# Patient Record
Sex: MALE | Race: WHITE | NOT HISPANIC OR LATINO | Employment: FULL TIME | URBAN - METROPOLITAN AREA
[De-identification: names, ages, dates, MRNs, and addresses within clinical notes are randomized per-mention and may not be internally consistent; named-entity substitution may affect disease eponyms.]

---

## 2017-10-17 ENCOUNTER — OFFICE VISIT (OUTPATIENT)
Dept: FAMILY MEDICINE | Facility: CLINIC | Age: 47
End: 2017-10-17
Payer: COMMERCIAL

## 2017-10-17 VITALS
OXYGEN SATURATION: 97 % | BODY MASS INDEX: 28.31 KG/M2 | TEMPERATURE: 97.2 F | DIASTOLIC BLOOD PRESSURE: 89 MMHG | HEART RATE: 93 BPM | HEIGHT: 73 IN | WEIGHT: 213.6 LBS | SYSTOLIC BLOOD PRESSURE: 143 MMHG

## 2017-10-17 DIAGNOSIS — M62.838 NECK MUSCLE SPASM: Primary | ICD-10-CM

## 2017-10-17 PROCEDURE — 99213 OFFICE O/P EST LOW 20 MIN: CPT | Performed by: FAMILY MEDICINE

## 2017-10-17 RX ORDER — DICLOFENAC SODIUM 75 MG/1
75 TABLET, DELAYED RELEASE ORAL 2 TIMES DAILY PRN
Qty: 30 TABLET | Refills: 0 | Status: SHIPPED | OUTPATIENT
Start: 2017-10-17 | End: 2018-04-25

## 2017-10-17 RX ORDER — HYDROCODONE BITARTRATE AND ACETAMINOPHEN 5; 325 MG/1; MG/1
1 TABLET ORAL EVERY 12 HOURS PRN
Qty: 5 TABLET | Refills: 0 | Status: SHIPPED | OUTPATIENT
Start: 2017-10-17 | End: 2018-04-25

## 2017-10-17 RX ORDER — CYCLOBENZAPRINE HCL 10 MG
10 TABLET ORAL 3 TIMES DAILY PRN
Qty: 42 TABLET | Refills: 0 | Status: SHIPPED | OUTPATIENT
Start: 2017-10-17 | End: 2018-04-25

## 2017-10-17 NOTE — MR AVS SNAPSHOT
After Visit Summary   10/17/2017    Michael Waters    MRN: [id number]           Patient Information     Date Of Birth          1970        Visit Information        Provider Department      10/17/2017 1:00 PM Kira Will MD [facility]        Today's Diagnoses     Neck muscle spasm    -  1      Care Instructions      Neck Spasm     A spasm of the neck muscles can happen after a sudden awkward neck movement. Sleeping with your neck in a crooked position can also cause spasm. Some people respond to emotional stress by tensing the muscles of their neck, shoulders, and upper back. If neck spasm lasts long enough, it can cause headache.  The treatment described below will usually help the pain to go away in 5 to 7 days. Pain that continues may need further evaluation or other types of treatment such as physical therapy.  Home care    Rest and relax the muscles. Use a comfortable pillow that supports the head and keeps the spine in a neutral position. The position of the head should not be tilted forward or backward. A rolled up towel may help for a custom fit.    Some people find relief with heat. Heat can be applied with either a warm shower or bath or a moist towel heated in the microwave and massage. Others prefer cold packs. You can make an ice pack by filling a plastic bag that seals at the top with ice cubes or crushed ice and then wrapping it with a thin towel. Try both and use the method that feels best for 15 to 20 minutes, several times a day.    Whether using ice or heat, be careful that you do not injure your skin. Never put ice directly on the skin. Always wrap the ice in a towel or other type of cloth. This is very important, especially in people with poor skin sensations.    Try to reduce your stress level. Emotional stress can lead to neck muscle tension and get in the way of or delay the healing process.    You may use over-the-counter pain medicine to control pain,  unless another medicine was prescribed.If you have chronic liver or kidney disease or ever had a stomach ulcer or GI bleeding, talk with your healthcare provider before using these medicines.  Follow-up care  Follow up with your healthcare provider if your symptoms do not show signs of improvement after one week. Physical therapy or further tests may be needed.  If X-rays, CT scans, or MRI scans were taken, you will be told of any new findings that may affect your care.  Call 911  Call 911 if you have:    Sudden weakness or numbness in one or both arms    Neck swelling, difficulty or painful swallowing    Difficulty breathing    Chest pain  When to seek medical advice  Call your healthcare provider right away if any of these occur:    Pain becomes worse or spreads into one or both arms    Increasing headache with nausea or vomiting    Fever of 100.4 F (38 C) or above lasting for 24 to 48 hours  Date Last Reviewed: 11/21/2015 2000-2017 [employer]. [address] [place] [address]. All rights reserved. This information is not intended as a substitute for professional medical care. Always follow your healthcare professional's instructions.                Follow-ups after your visit        Follow-up notes from your care team     Return if symptoms worsen or fail to improve.      Who to contact     Normal or non-critical lab and imaging results will be communicated to you by MyChart, letter or phone within 4 business days after the clinic has received the results. If you do not hear from us within 7 days, please contact the clinic through MyChart or phone. If you have a critical or abnormal lab result, we will notify you by phone as soon as possible.  Submit refill requests through [employer] or call your pharmacy and they will forward the refill request to us. Please allow 3 business days for your refill to be completed.          If you need to speak with a  for additional  "information , please call: [phone]             Additional Information About Your Visit        [employer] Information     [employer] gives you secure access to your electronic health record. If you see a primary care provider, you can also send messages to your care team and make appointments. If you have questions, please call your primary care clinic.  If you do not have a primary care provider, please call [phone] and they will assist you.        Care EveryWhere ID     This is your Care EveryWhere ID. This could be used by other organizations to access your [facility] medical records  [id number]        Your Vitals Were     Pulse Temperature Height Pulse Oximetry BMI (Body Mass Index)       93 97.2  F (36.2  C) (Tympanic) 6' 1\" (1.854 m) 97% 28.18 kg/m2        Blood Pressure from Last 3 Encounters:   10/17/17 143/89   05/20/16 111/79   11/24/15 126/86    Weight from Last 3 Encounters:   10/17/17 213 lb 9.6 oz (96.9 kg)   05/20/16 212 lb (96.2 kg)   11/24/15 218 lb (98.9 kg)              Today, you had the following     No orders found for display         Today's Medication Changes          These changes are accurate as of: 10/17/17  1:18 PM.  If you have any questions, ask your nurse or doctor.               Start taking these medicines.        Dose/Directions    cyclobenzaprine 10 MG tablet   Commonly known as:  FLEXERIL   Used for:  Neck muscle spasm   Started by:  Kira Will MD        Dose:  10 mg   Take 1 tablet (10 mg) by mouth 3 times daily as needed   Quantity:  42 tablet   Refills:  0       diclofenac 75 MG EC tablet   Commonly known as:  VOLTAREN   Used for:  Neck muscle spasm   Started by:  Kira Will MD        Dose:  75 mg   Take 1 tablet (75 mg) by mouth 2 times daily as needed for moderate pain (take with meals)   Quantity:  30 tablet   Refills:  0       HYDROcodone-acetaminophen 5-325 MG per tablet   Commonly known as:  NORCO   Used for:  Neck muscle spasm   Started by:  Nicolette" MD Kira        Dose:  1 tablet   Take 1 tablet by mouth every 12 hours as needed for moderate to severe pain (breakthrough pain.) maximum 2 tablet(s) per day   Quantity:  5 tablet   Refills:  0            Where to get your medicines      These medications were sent to [id number] - [place], [place] - [address]  [address] [place] [address]     Phone:  [phone]     cyclobenzaprine 10 MG tablet    diclofenac 75 MG EC tablet         Some of these will need a paper prescription and others can be bought over the counter.  Ask your nurse if you have questions.     Bring a paper prescription for each of these medications     HYDROcodone-acetaminophen 5-325 MG per tablet                Primary Care Provider Office Phone # Fax #    Devyn Hanson PA-C [phone]       [address]  [place] [address]        Equal Access to Services     [facility]: Hadii aad ku hadasho Soomaali, waaxda luqadaha, qaybta kaalmada adeegyada, waxay jenaroin hayaan adetania lafleur . So [facility] [phone].    ATENCIÓN: Si habla español, tiene a lambert disposición servicios gratuitos de asistencia lingüística. [facility] [phone].    We comply with applicable federal civil rights laws and [place] laws. We do not discriminate on the basis of race, color, national origin, age, disability, sex, sexual orientation, or gender identity.            Thank you!     Thank you for choosing [facility]  for your care. Our goal is always to provide you with excellent care. Hearing back from our patients is one way we can continue to improve our services. Please take a few minutes to complete the written survey that you may receive in the mail after your visit with us. Thank you!             Your Updated Medication List - Protect others around you: Learn how to safely use, store and throw away your medicines at www.disposemymeds.org.          This list is accurate as of: 10/17/17  1:18 PM.   Always use your most recent med list.                   Brand Name Dispense Instructions for use Diagnosis    cyclobenzaprine 10 MG tablet    FLEXERIL    42 tablet    Take 1 tablet (10 mg) by mouth 3 times daily as needed    Neck muscle spasm       diclofenac 75 MG EC tablet    VOLTAREN    30 tablet    Take 1 tablet (75 mg) by mouth 2 times daily as needed for moderate pain (take with meals)    Neck muscle spasm       HYDROcodone-acetaminophen 5-325 MG per tablet    NORCO    5 tablet    Take 1 tablet by mouth every 12 hours as needed for moderate to severe pain (breakthrough pain.) maximum 2 tablet(s) per day    Neck muscle spasm
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
## 2017-10-17 NOTE — NURSING NOTE
"Chief Complaint   Patient presents with     Neck Pain       Initial /89  Pulse 93  Temp 97.2  F (36.2  C) (Tympanic)  Ht 6' 1\" (1.854 m)  Wt 213 lb 9.6 oz (96.9 kg)  SpO2 97%  BMI 28.18 kg/m2 Estimated body mass index is 28.18 kg/(m^2) as calculated from the following:    Height as of this encounter: 6' 1\" (1.854 m).    Weight as of this encounter: 213 lb 9.6 oz (96.9 kg).  Medication Reconciliation: complete       Yoli Lane MA      "

## 2017-10-17 NOTE — PATIENT INSTRUCTIONS
Neck Spasm     A spasm of the neck muscles can happen after a sudden awkward neck movement. Sleeping with your neck in a crooked position can also cause spasm. Some people respond to emotional stress by tensing the muscles of their neck, shoulders, and upper back. If neck spasm lasts long enough, it can cause headache.  The treatment described below will usually help the pain to go away in 5 to 7 days. Pain that continues may need further evaluation or other types of treatment such as physical therapy.  Home care    Rest and relax the muscles. Use a comfortable pillow that supports the head and keeps the spine in a neutral position. The position of the head should not be tilted forward or backward. A rolled up towel may help for a custom fit.    Some people find relief with heat. Heat can be applied with either a warm shower or bath or a moist towel heated in the microwave and massage. Others prefer cold packs. You can make an ice pack by filling a plastic bag that seals at the top with ice cubes or crushed ice and then wrapping it with a thin towel. Try both and use the method that feels best for 15 to 20 minutes, several times a day.    Whether using ice or heat, be careful that you do not injure your skin. Never put ice directly on the skin. Always wrap the ice in a towel or other type of cloth. This is very important, especially in people with poor skin sensations.    Try to reduce your stress level. Emotional stress can lead to neck muscle tension and get in the way of or delay the healing process.    You may use over-the-counter pain medicine to control pain, unless another medicine was prescribed.If you have chronic liver or kidney disease or ever had a stomach ulcer or GI bleeding, talk with your healthcare provider before using these medicines.  Follow-up care  Follow up with your healthcare provider if your symptoms do not show signs of improvement after one week. Physical therapy or further tests may be  needed.  If X-rays, CT scans, or MRI scans were taken, you will be told of any new findings that may affect your care.  Call 911  Call 911 if you have:    Sudden weakness or numbness in one or both arms    Neck swelling, difficulty or painful swallowing    Difficulty breathing    Chest pain  When to seek medical advice  Call your healthcare provider right away if any of these occur:    Pain becomes worse or spreads into one or both arms    Increasing headache with nausea or vomiting    Fever of 100.4 F (38 C) or above lasting for 24 to 48 hours  Date Last Reviewed: 11/21/2015 2000-2017 [employer]. [address] [place] [address]. All rights reserved. This information is not intended as a substitute for professional medical care. Always follow your healthcare professional's instructions.

## 2018-04-25 ENCOUNTER — OFFICE VISIT (OUTPATIENT)
Dept: INTERNAL MEDICINE | Facility: CLINIC | Age: 48
End: 2018-04-25
Payer: COMMERCIAL

## 2018-04-25 VITALS
BODY MASS INDEX: 27.97 KG/M2 | SYSTOLIC BLOOD PRESSURE: 125 MMHG | DIASTOLIC BLOOD PRESSURE: 83 MMHG | HEART RATE: 91 BPM | RESPIRATION RATE: 16 BRPM | TEMPERATURE: 91 F | WEIGHT: 212 LBS

## 2018-04-25 DIAGNOSIS — F33.2 SEVERE EPISODE OF RECURRENT MAJOR DEPRESSIVE DISORDER, WITHOUT PSYCHOTIC FEATURES (H): Primary | ICD-10-CM

## 2018-04-25 PROCEDURE — 36415 COLL VENOUS BLD VENIPUNCTURE: CPT | Performed by: INTERNAL MEDICINE

## 2018-04-25 PROCEDURE — 99215 OFFICE O/P EST HI 40 MIN: CPT | Performed by: INTERNAL MEDICINE

## 2018-04-25 PROCEDURE — 84443 ASSAY THYROID STIM HORMONE: CPT | Performed by: INTERNAL MEDICINE

## 2018-04-25 RX ORDER — ESCITALOPRAM OXALATE 20 MG/1
TABLET ORAL
Qty: 30 TABLET | Refills: 1 | Status: SHIPPED | OUTPATIENT
Start: 2018-04-25 | End: 2018-06-25

## 2018-04-25 ASSESSMENT — ANXIETY QUESTIONNAIRES
5. BEING SO RESTLESS THAT IT IS HARD TO SIT STILL: SEVERAL DAYS
2. NOT BEING ABLE TO STOP OR CONTROL WORRYING: NEARLY EVERY DAY
IF YOU CHECKED OFF ANY PROBLEMS ON THIS QUESTIONNAIRE, HOW DIFFICULT HAVE THESE PROBLEMS MADE IT FOR YOU TO DO YOUR WORK, TAKE CARE OF THINGS AT HOME, OR GET ALONG WITH OTHER PEOPLE: VERY DIFFICULT
7. FEELING AFRAID AS IF SOMETHING AWFUL MIGHT HAPPEN: SEVERAL DAYS
1. FEELING NERVOUS, ANXIOUS, OR ON EDGE: NEARLY EVERY DAY
GAD7 TOTAL SCORE: 14
3. WORRYING TOO MUCH ABOUT DIFFERENT THINGS: NEARLY EVERY DAY
6. BECOMING EASILY ANNOYED OR IRRITABLE: SEVERAL DAYS

## 2018-04-25 ASSESSMENT — PATIENT HEALTH QUESTIONNAIRE - PHQ9: 5. POOR APPETITE OR OVEREATING: MORE THAN HALF THE DAYS

## 2018-04-25 NOTE — MR AVS SNAPSHOT
After Visit Summary   4/25/2018    Michael Watesr    MRN: [id number]           Patient Information     Date Of Birth          1970        Visit Information        Provider Department      4/25/2018 3:30 PM [facility]        Today's Diagnoses     Severe episode of recurrent major depressive disorder, without psychotic features (H)    -  1       Follow-ups after your visit        Additional Services     MENTAL HEALTH REFERRAL  - Adult; Outpatient Treatment; Dialectical Behavior Therapy; G: [facility] [phone]; We will contact you to schedule the appointment or please call with any questions       All scheduling is subject to the client's specific insurance plan & benefits, provider/location availability, and provider clinical specialities.  Please arrive 15 minutes early for your first appointment and bring your completed paperwork.    Please be aware that coverage of these services is subject to the terms and limitations of your health insurance plan.  Call member services at your health plan with any benefit or coverage questions.                            Follow-up notes from your care team     Return in about 6 weeks (around 6/6/2018) for Depression follow-up.      Who to contact     Normal or non-critical lab and imaging results will be communicated to you by MyChart, letter or phone within 4 business days after the clinic has received the results. If you do not hear from us within 7 days, please contact the clinic through MyChart or phone. If you have a critical or abnormal lab result, we will notify you by phone as soon as possible.  Submit refill requests through [employer] or call your pharmacy and they will forward the refill request to us. Please allow 3 business days for your refill to be completed.          If you need to speak with a  for additional information , please call: [phone]             Additional  Information About Your Visit        [employer] Information     [employer] gives you secure access to your electronic health record. If you see a primary care provider, you can also send messages to your care team and make appointments. If you have questions, please call your primary care clinic.  If you do not have a primary care provider, please call [phone] and they will assist you.        Care EveryWhere ID     This is your Care EveryWhere ID. This could be used by other organizations to access your [facility] medical records  [id number]        Your Vitals Were     Pulse Temperature Respirations BMI (Body Mass Index)          91 91  F (32.8  C) (Tympanic) 16 27.97 kg/m2         Blood Pressure from Last 3 Encounters:   04/25/18 125/83   10/17/17 143/89   05/20/16 111/79    Weight from Last 3 Encounters:   04/25/18 212 lb (96.2 kg)   10/17/17 213 lb 9.6 oz (96.9 kg)   05/20/16 212 lb (96.2 kg)              We Performed the Following     MENTAL HEALTH REFERRAL  - Adult; Outpatient Treatment; Dialectical Behavior Therapy; FMG: [facility] [phone]; We will contact you to schedule the appointment or please call with any questions     TSH with free T4 reflex          Today's Medication Changes          These changes are accurate as of 4/25/18  4:19 PM.  If you have any questions, ask your nurse or doctor.               Start taking these medicines.        Dose/Directions    escitalopram 20 MG tablet   Commonly known as:  LEXAPRO   Used for:  Severe episode of recurrent major depressive disorder, without psychotic features (H)   Started by:  Rip Garcia MD        Take 1/2 tablet (10 mg) for 1-2 weeks, then increase to 1 tablet orally daily   Quantity:  30 tablet   Refills:  1         Stop taking these medicines if you haven't already. Please contact your care team if you have questions.     cyclobenzaprine 10 MG tablet   Commonly known as:  FLEXERIL   Stopped by:  Rip Garcia MD            diclofenac 75 MG EC tablet   Commonly known as:  VOLTAREN   Stopped by:  Rip Garcia MD           HYDROcodone-acetaminophen 5-325 MG per tablet   Commonly known as:  NORCO   Stopped by:  Rip Garcia MD                Where to get your medicines      These medications were sent to [id number] - [place], [place] - [address]  [address], [place] [address]     Phone:  [phone]     escitalopram 20 MG tablet                Primary Care Provider Office Phone # Fax #    Devyn Hanson PA-C [phone] [address] [facility] [address]        Equal Access to Services     [facility]: Hadii aad ku hadasho Soomaali, waaxda luqadaha, qaybta kaalmada adeegyada, waxjennifer easonin haymikin marcelina lafleur . So [facility] [phone].    ATENCIÓN: Si habla español, tiene a lambert disposición servicios gratuitos de asistencia lingüística. Llame al [phone].    We comply with applicable federal civil rights laws and [place] laws. We do not discriminate on the basis of race, color, national origin, age, disability, sex, sexual orientation, or gender identity.            Thank you!     Thank you for choosing [facility]  for your care. Our goal is always to provide you with excellent care. Hearing back from our patients is one way we can continue to improve our services. Please take a few minutes to complete the written survey that you may receive in the mail after your visit with us. Thank you!             Your Updated Medication List - Protect others around you: Learn how to safely use, store and throw away your medicines at www.disposemymeds.org.          This list is accurate as of 4/25/18  4:19 PM.  Always use your most recent med list.                   Brand Name Dispense Instructions for use Diagnosis    escitalopram 20 MG tablet    LEXAPRO    30 tablet    Take 1/2 tablet (10 mg) for 1-2 weeks, then increase to 1 tablet orally daily    Severe episode of recurrent  major depressive disorder, without psychotic features (H)

## 2018-04-25 NOTE — PROGRESS NOTES
"  SUBJECTIVE:   Michael Waters is a 47 year old male who presents to clinic today for the following health issues:      Abnormal Mood Symptoms  Onset: 20 years but symptoms increased over the past month    Description:   Depression: YES  Anxiety: YES    Accompanying Signs & Symptoms:  Still participating in activities that you used to enjoy: no  Fatigue: YES  Irritability: YES  Difficulty concentrating: YES  Changes in appetite: YES  Problems with sleep: no  Heart racing/beating fast : YES  Thoughts of hurting yourself or others: Presently YES and yes    History:   Recent stress: no  Prior depression hospitalization: yes -x2  Family history of depression: no  Family history of anxiety: no    Precipitating factors:   Alcohol/drug use: YES- occasional drinker    Alleviating factors:  nothing    Therapies Tried and outcome: Ativan (Lorezepam), Effexor XR (Venafaxine), Paxil (Paroxetine), Prozac (Fluoxetine) and Wellbutrin (Bupropion) -- was on all of these many years ago -- he believes approximately 20-25 years ago last.  Did have some psychotherapy but no recent work or medications.  He denies anything more than occasional alcohol.  Denies other chemical use, significant new psychosocial stressors.  He is most disturbed by lack of energy and desire to sleep all the time.      Importantly, he endorses significant suicidal ideation with deliberation toward plan but without clear current intent or plan.  He reports desire to not suicide and wants to be out of his dark depression.  His coworkers and family are unaware but his parents are aware of his more distant depression with, as patient reports is, history of \"weekend stays\" in the hospital.    Social History     Social History Narrative    Works for [employer]    Re-    3 kids       Reports otherwise normal health.    1. Severe episode of recurrent major depressive disorder, without psychotic features (H)        PMH: Updated " and/or reviewed in chart.    PSH: Updated and/or reviewed in chart.    Family History: Updated and/or reviewed in chart.     ROS:  Constitutional, neuro, EMT, endocrine, pulmonary, cardiac, gastrointestinal, genitourinary, musculoskeletal, integument and psychiatric systems are otherwise negative.    OBJECTIVE:                                                    /83  Pulse 91  Temp (!) 91  F (32.8  C) (Tympanic)  Resp 16  Wt 212 lb (96.2 kg)  BMI 27.97 kg/m2    GEN: No acute distress  EYES: No conjunctival injection or icterus, EOMI grossly intact  RESP: Unlabored, regular  NEURO: Normal gait, MAEx4, light touch sensation grossly intact  PSYCH: Depressed mood, tearful, otherwise appropriate affect     ASSESSMENT/PLAN:                                                    1. Severe episode of recurrent major depressive disorder, without psychotic features (H)  We discussed risks, benefits, and alternatives of SSRI treatment.  He had been reading up and was open to trial of sertraline or escitalopram.  We discussed importance of psychotherapy as well as pharmacotherapy, safety plan (pt to call here or 911 if return of suicidal ideation, intent, or plan), and medication follow-up in 6 weeks.  Would trial sertraline if failing escitalopram then consider newer agents given significant poor history of responses to other SSRIs, such as vortioxetine.  Patient agreed with plan and demonstrated understanding to contact us for help if not improving or sooner if worsening or if other questions or concerns arise.  - MENTAL HEALTH REFERRAL  - Adult; Outpatient Treatment; Dialectical Behavior Therapy; [facility] [place]: [facility] [phone]; We will contact you to schedule the appointment or please call with any questions  - escitalopram (LEXAPRO) 20 MG tablet; Take 1/2 tablet (10 mg) for 1-2 weeks, then increase to 1 tablet orally daily  Dispense: 30 tablet; Refill: 1  - TSH with free T4 reflex     Orders Placed  This Encounter     TSH with free T4 reflex     MENTAL HEALTH REFERRAL  - Adult; Outpatient Treatment; Dialectical Behavior Therapy; [facility] [place] [place]: [facility] [phone]; We will contact you to schedule the appointment or please call with any questions     escitalopram (LEXAPRO) 20 MG tablet          Rip Garcia MD

## 2018-04-26 LAB — TSH SERPL DL<=0.005 MIU/L-ACNC: 1.28 MU/L (ref 0.4–4)

## 2018-04-26 ASSESSMENT — ANXIETY QUESTIONNAIRES: GAD7 TOTAL SCORE: 14

## 2018-04-26 ASSESSMENT — PATIENT HEALTH QUESTIONNAIRE - PHQ9: SUM OF ALL RESPONSES TO PHQ QUESTIONS 1-9: 20

## 2018-06-25 DIAGNOSIS — F33.2 SEVERE EPISODE OF RECURRENT MAJOR DEPRESSIVE DISORDER, WITHOUT PSYCHOTIC FEATURES (H): ICD-10-CM

## 2018-06-25 RX ORDER — ESCITALOPRAM OXALATE 20 MG/1
TABLET ORAL
Qty: 30 TABLET | Refills: 0 | Status: SHIPPED | OUTPATIENT
Start: 2018-06-25 | End: 2018-08-22

## 2018-06-25 NOTE — TELEPHONE ENCOUNTER
Requested Prescriptions   Pending Prescriptions Disp Refills     escitalopram (LEXAPRO) 20 MG tablet  Last Written Prescription Date:  05/26/18  Last Fill Quantity: 30,  # refills: 1  Last office visit: 4/25/2018 with prescribing provider:  SELVIN Garcia   Future Office Visit:     30 tablet 1     Sig: Take 1/2 tablet (10 mg) for 1-2 weeks, then increase to 1 tablet orally daily    There is no refill protocol information for this order   PHQ-9 SCORE 12/23/2011 4/25/2018   Total Score 16 -   Total Score - 20     [place]-7 SCORE 4/25/2018   Total Score 14

## 2018-07-02 PROBLEM — F33.2 SEVERE EPISODE OF RECURRENT MAJOR DEPRESSIVE DISORDER, WITHOUT PSYCHOTIC FEATURES (H): Status: ACTIVE | Noted: 2018-07-02

## 2018-08-02 DIAGNOSIS — F33.2 SEVERE EPISODE OF RECURRENT MAJOR DEPRESSIVE DISORDER, WITHOUT PSYCHOTIC FEATURES (H): ICD-10-CM

## 2018-08-02 RX ORDER — ESCITALOPRAM OXALATE 20 MG/1
TABLET ORAL
Qty: 30 TABLET | Refills: 0 | Status: CANCELLED | OUTPATIENT
Start: 2018-08-02

## 2018-08-02 NOTE — TELEPHONE ENCOUNTER
Requested Prescriptions   Pending Prescriptions Disp Refills     escitalopram (LEXAPRO) 20 MG tablet 30 tablet 0     Sig: Take 1/2 tablet (10 mg) for 1-2 weeks, then increase to 1 tablet orally daily    There is no refill protocol information for this order      Last Written Prescription Date:  06/25/18  Last Fill Quantity: 30,  # refills: 0   Last office visit: 5/20/2016 with prescribing provider:     Future Office Visit:

## 2018-08-13 DIAGNOSIS — F33.2 SEVERE EPISODE OF RECURRENT MAJOR DEPRESSIVE DISORDER, WITHOUT PSYCHOTIC FEATURES (H): ICD-10-CM

## 2018-08-13 RX ORDER — ESCITALOPRAM OXALATE 20 MG/1
TABLET ORAL
Qty: 30 TABLET | Refills: 0 | Status: CANCELLED | OUTPATIENT
Start: 2018-08-13

## 2018-08-13 NOTE — TELEPHONE ENCOUNTER
Requested Prescriptions   Pending Prescriptions Disp Refills     escitalopram (LEXAPRO) 20 MG tablet  Last Written Prescription Date:  06/25/18  Last Fill Quantity: 30,  # refills: 0   Last office visit: 4/25/2018 with prescribing provider:  SELVIN Garcia   Future Office Visit:    [place]-7 SCORE 4/25/2018   Total Score 14     PHQ-9 SCORE 12/23/2011 4/25/2018   Total Score 16 -   Total Score - 20      30 tablet 0     Sig: Take 1/2 tablet (10 mg) for 1-2 weeks, then increase to 1 tablet orally daily    There is no refill protocol information for this order

## 2018-08-22 ENCOUNTER — APPOINTMENT (OUTPATIENT)
Dept: INTERNAL MEDICINE | Facility: CLINIC | Age: 48
End: 2018-08-22
Payer: COMMERCIAL

## 2018-08-22 ENCOUNTER — OFFICE VISIT (OUTPATIENT)
Dept: INTERNAL MEDICINE | Facility: CLINIC | Age: 48
End: 2018-08-22
Payer: COMMERCIAL

## 2018-08-22 VITALS
BODY MASS INDEX: 29.12 KG/M2 | RESPIRATION RATE: 16 BRPM | SYSTOLIC BLOOD PRESSURE: 130 MMHG | DIASTOLIC BLOOD PRESSURE: 84 MMHG | HEART RATE: 92 BPM | HEIGHT: 72 IN | TEMPERATURE: 98.4 F | WEIGHT: 215 LBS

## 2018-08-22 DIAGNOSIS — Z13.1 SCREENING FOR DIABETES MELLITUS: ICD-10-CM

## 2018-08-22 DIAGNOSIS — Z12.5 SCREENING FOR PROSTATE CANCER: ICD-10-CM

## 2018-08-22 DIAGNOSIS — F33.2 SEVERE EPISODE OF RECURRENT MAJOR DEPRESSIVE DISORDER, WITHOUT PSYCHOTIC FEATURES (H): Primary | ICD-10-CM

## 2018-08-22 DIAGNOSIS — R04.0 EPISTAXIS: ICD-10-CM

## 2018-08-22 DIAGNOSIS — L20.89 OTHER ATOPIC DERMATITIS: ICD-10-CM

## 2018-08-22 DIAGNOSIS — Z00.00 ROUTINE GENERAL MEDICAL EXAMINATION AT A HEALTH CARE FACILITY: ICD-10-CM

## 2018-08-22 DIAGNOSIS — Z13.220 LIPID SCREENING: ICD-10-CM

## 2018-08-22 DIAGNOSIS — G47.33 OSA (OBSTRUCTIVE SLEEP APNEA): ICD-10-CM

## 2018-08-22 DIAGNOSIS — M79.674 PAIN OF TOE OF RIGHT FOOT: ICD-10-CM

## 2018-08-22 DIAGNOSIS — M71.30 SYNOVIAL CYST: ICD-10-CM

## 2018-08-22 PROCEDURE — 99214 OFFICE O/P EST MOD 30 MIN: CPT | Mod: 25 | Performed by: INTERNAL MEDICINE

## 2018-08-22 PROCEDURE — 99396 PREV VISIT EST AGE 40-64: CPT | Performed by: INTERNAL MEDICINE

## 2018-08-22 RX ORDER — CLOBETASOL PROPIONATE 0.5 MG/G
OINTMENT TOPICAL
Qty: 30 G | Refills: 2 | Status: SHIPPED | OUTPATIENT
Start: 2018-08-22 | End: 2021-07-05

## 2018-08-22 ASSESSMENT — ANXIETY QUESTIONNAIRES
6. BECOMING EASILY ANNOYED OR IRRITABLE: MORE THAN HALF THE DAYS
2. NOT BEING ABLE TO STOP OR CONTROL WORRYING: MORE THAN HALF THE DAYS
3. WORRYING TOO MUCH ABOUT DIFFERENT THINGS: MORE THAN HALF THE DAYS
5. BEING SO RESTLESS THAT IT IS HARD TO SIT STILL: SEVERAL DAYS
7. FEELING AFRAID AS IF SOMETHING AWFUL MIGHT HAPPEN: NOT AT ALL
1. FEELING NERVOUS, ANXIOUS, OR ON EDGE: MORE THAN HALF THE DAYS
GAD7 TOTAL SCORE: 11
IF YOU CHECKED OFF ANY PROBLEMS ON THIS QUESTIONNAIRE, HOW DIFFICULT HAVE THESE PROBLEMS MADE IT FOR YOU TO DO YOUR WORK, TAKE CARE OF THINGS AT HOME, OR GET ALONG WITH OTHER PEOPLE: SOMEWHAT DIFFICULT

## 2018-08-22 ASSESSMENT — PATIENT HEALTH QUESTIONNAIRE - PHQ9: 5. POOR APPETITE OR OVEREATING: MORE THAN HALF THE DAYS

## 2018-08-22 NOTE — PATIENT INSTRUCTIONS
Call insurance company to ask for [employer] testing information (for depression genetics)      Preventive Health Recommendations  Male Ages 40 to 49    Yearly exam:             See your health care provider every year in order to  o   Review health changes.   o   Discuss preventive care.    o   Review your medicines if your doctor has prescribed any.    You should be tested each year for STDs (sexually transmitted diseases) if you re at risk.     Have a cholesterol test every 5 years.     Have a colonoscopy (test for colon cancer) if someone in your family has had colon cancer or polyps before age 50.     After age 45, have a diabetes test (fasting glucose). If you are at risk for diabetes, you should have this test every 3 years.      Talk with your health care provider about whether or not a prostate cancer screening test (PSA) is right for you.    Shots: Get a flu shot each year. Get a tetanus shot every 10 years.     Nutrition:    Eat at least 5 servings of fruits and vegetables daily.     Eat whole-grain bread, whole-wheat pasta and brown rice instead of white grains and rice.     Get adequate Calcium and Vitamin D.     Lifestyle    Exercise for at least 150 minutes a week (30 minutes a day, 5 days a week). This will help you control your weight and prevent disease.     Limit alcohol to one drink per day.     No smoking.     Wear sunscreen to prevent skin cancer.     See your dentist every six months for an exam and cleaning.

## 2018-08-22 NOTE — MR AVS SNAPSHOT
After Visit Summary   8/22/2018    Michael Waters    MRN: [id number]           Patient Information     Date Of Birth          1970        Visit Information        Provider Department      8/22/2018 4:00 PM Rip Garcia MD [facility]        Today's Diagnoses     Severe episode of recurrent major depressive disorder, without psychotic features (H)    -  1    LISE (obstructive sleep apnea)        Lipid screening        Screening for diabetes mellitus        Other atopic dermatitis        Pain of toe of right foot        Screening for prostate cancer        Epistaxis        Synovial cyst of left elbow          Care Instructions       Call insurance company to ask for [employer] testing information (for depression genetics)      Preventive Health Recommendations  Male Ages 40 to 49    Yearly exam:             See your health care provider every year in order to  o   Review health changes.   o   Discuss preventive care.    o   Review your medicines if your doctor has prescribed any.    You should be tested each year for STDs (sexually transmitted diseases) if you re at risk.     Have a cholesterol test every 5 years.     Have a colonoscopy (test for colon cancer) if someone in your family has had colon cancer or polyps before age 50.     After age 45, have a diabetes test (fasting glucose). If you are at risk for diabetes, you should have this test every 3 years.      Talk with your health care provider about whether or not a prostate cancer screening test (PSA) is right for you.    Shots: Get a flu shot each year. Get a tetanus shot every 10 years.     Nutrition:    Eat at least 5 servings of fruits and vegetables daily.     Eat whole-grain bread, whole-wheat pasta and brown rice instead of white grains and rice.     Get adequate Calcium and Vitamin D.     Lifestyle    Exercise for at least 150 minutes a week (30 minutes a day, 5 days a week). This will help you control your weight and  prevent disease.     Limit alcohol to one drink per day.     No smoking.     Wear sunscreen to prevent skin cancer.     See your dentist every six months for an exam and cleaning.              Follow-ups after your visit        Additional Services     MENTAL HEALTH REFERRAL  - Adult; Outpatient Treatment; Individual/Couples/Family/Group Therapy/Health Psychology; [facility]: [facility] [phone]; We will contact you to schedule the appointment or please call with any questions       All scheduling is subject to the client's specific insurance plan & benefits, provider/location availability, and provider clinical specialities.  Please arrive 15 minutes early for your first appointment and bring your completed paperwork.    Please be aware that coverage of these services is subject to the terms and limitations of your health insurance plan.  Call member services at your health plan with any benefit or coverage questions.                      ORTHO  REFERRAL       [facility] is referring you to the Orthopedic  Services at [facility].       The  Representative will assist you in the coordination of your Orthopedic and Musculoskeletal Care as prescribed by your physician.    The  Representative will call you within 1 business day to help schedule your appointment, or you may contact the  Representative at:    All areas ~ [phone]     Type of Referral : Surgical / Specialist -- upper extremity      Timeframe requested: Routine    Coverage of these services is subject to the terms and limitations of your health insurance plan.  Please call member services at your health plan with any benefit or coverage questions.      If X-rays, CT or MRI's have been performed, please contact the facility where they were done to arrange for , prior to your scheduled appointment.  Please bring this referral request to your  appointment and present it to your specialist.            OTOLARYNGOLOGY REFERRAL       Your provider has referred you to: [facility]: [facility] [place] [phone]   [email or web address]    Please be aware that coverage of these services is subject to the terms and limitations of your health insurance plan.  Call member services at your health plan with any benefit or coverage questions.      Please bring the following with you to your appointment:    (1) Any X-Rays, CTs or MRIs which have been performed.  Contact the facility where they were done to arrange for  prior to your scheduled appointment.   (2) List of current medications  (3) This referral request   (4) Any documents/labs given to you for this referral            SLEEP EVALUATION & MANAGEMENT REFERRAL - [facility]  [phone] (Age 15 and up)       Please be aware that coverage of these services is subject to the terms and limitations of your health insurance plan.  Call member services at your health plan with any benefit or coverage questions.      Please bring the following to your appointment:    >>   List of current medications   >>   This referral request   >>   Any documents/labs given to you for this referral                      Follow-up notes from your care team     Return for Fasting Lab Work.      Future tests that were ordered for you today     Open Future Orders        Priority Expected Expires Ordered    **Glucose FUTURE anytime Routine 8/22/2018 8/22/2019 8/22/2018    Lipid panel reflex to direct LDL Fasting Routine 8/22/2018 8/22/2019 8/22/2018    SLEEP EVALUATION & MANAGEMENT REFERRAL - [facility]  [phone] (Age 15 and up) Routine  8/22/2019 8/22/2018            Who to contact     Normal or non-critical lab and imaging results will be communicated to you by MyChart, letter or phone within 4 business days after the clinic  "has received the results. If you do not hear from us within 7 days, please contact the clinic through [employer] or phone. If you have a critical or abnormal lab result, we will notify you by phone as soon as possible.  Submit refill requests through [employer] or call your pharmacy and they will forward the refill request to us. Please allow 3 business days for your refill to be completed.          If you need to speak with a  for additional information , please call: [phone]             Additional Information About Your Visit        [employer] Information     [employer] gives you secure access to your electronic health record. If you see a primary care provider, you can also send messages to your care team and make appointments. If you have questions, please call your primary care clinic.  If you do not have a primary care provider, please call [phone] and they will assist you.        Care EveryWhere ID     This is your Care EveryWhere ID. This could be used by other organizations to access your [place] medical records  [id number]        Your Vitals Were     Pulse Temperature Respirations Height BMI (Body Mass Index)       92 98.4  F (36.9  C) (Tympanic) 16 5' 11.75\" (1.822 m) 29.36 kg/m2        Blood Pressure from Last 3 Encounters:   08/22/18 130/84   04/25/18 125/83   10/17/17 143/89    Weight from Last 3 Encounters:   08/22/18 215 lb (97.5 kg)   04/25/18 212 lb (96.2 kg)   10/17/17 213 lb 9.6 oz (96.9 kg)              We Performed the Following     MENTAL HEALTH REFERRAL  - Adult; Outpatient Treatment; Individual/Couples/Family/Group Therapy/Health Psychology; [facility]: [facility] [phone]; We will contact you to schedule the appointment or please call with any questions     ORTHO  REFERRAL     OTHER ORDERS SCAN     OTOLARYNGOLOGY REFERRAL     PSA, screen          Today's Medication Changes          These changes are accurate as of 8/22/18  5:00 PM.  If you have any " questions, ask your nurse or doctor.               Start taking these medicines.        Dose/Directions    clobetasol 0.05 % ointment   Commonly known as:  TEMOVATE   Used for:  Other atopic dermatitis   Started by:  Rip Garcia MD        Apply sparingly to affected area twice daily for 14 days.  Do not apply to face.   Quantity:  30 g   Refills:  2         Stop taking these medicines if you haven't already. Please contact your care team if you have questions.     escitalopram 20 MG tablet   Commonly known as:  LEXAPRO   Stopped by:  Rip Garcia MD                Where to get your medicines      These medications were sent to [id number]  [place], [place] - [address]  [address], [place] [address]     Phone:  [phone]     clobetasol 0.05 % ointment                Primary Care Provider Office Phone # Fax #    Devyn Corina Hanson PA-C [phone]       [address] [facility] [address]        Equal Access to Services     [facility]: Hadii aad ku hadasho Soomaali, waaxda luqadaha, qaybta kaalmada adeegyada, waxay idiin hayaan adeeg kharash lagurvinder . So [facility] [phone].    ATENCIÓN: Si habla español, tiene a lambert disposición servicios gratuitos de asistencia lingüística. [facility] [phone].    We comply with applicable federal civil rights laws and [place] laws. We do not discriminate on the basis of race, color, national origin, age, disability, sex, sexual orientation, or gender identity.            Thank you!     Thank you for choosing [facility]  for your care. Our goal is always to provide you with excellent care. Hearing back from our patients is one way we can continue to improve our services. Please take a few minutes to complete the written survey that you may receive in the mail after your visit with us. Thank you!             Your Updated Medication List - Protect others around you: Learn how to safely use, store and throw away your medicines  at www.disposemymeds.org.          This list is accurate as of 8/22/18  5:00 PM.  Always use your most recent med list.                   Brand Name Dispense Instructions for use Diagnosis    clobetasol 0.05 % ointment    TEMOVATE    30 g    Apply sparingly to affected area twice daily for 14 days.  Do not apply to face.    Other atopic dermatitis

## 2018-08-24 DIAGNOSIS — Z13.220 LIPID SCREENING: ICD-10-CM

## 2018-08-24 DIAGNOSIS — Z13.1 SCREENING FOR DIABETES MELLITUS: ICD-10-CM

## 2018-08-24 LAB
CHOLEST SERPL-MCNC: 189 MG/DL
GLUCOSE SERPL-MCNC: 87 MG/DL (ref 70–99)
HDLC SERPL-MCNC: 57 MG/DL
LDLC SERPL CALC-MCNC: 113 MG/DL
NONHDLC SERPL-MCNC: 132 MG/DL
TRIGL SERPL-MCNC: 94 MG/DL

## 2018-08-24 PROCEDURE — 36415 COLL VENOUS BLD VENIPUNCTURE: CPT | Performed by: INTERNAL MEDICINE

## 2018-08-24 PROCEDURE — 80061 LIPID PANEL: CPT | Performed by: INTERNAL MEDICINE

## 2018-08-24 PROCEDURE — 82947 ASSAY GLUCOSE BLOOD QUANT: CPT | Performed by: INTERNAL MEDICINE

## 2018-08-24 ASSESSMENT — ANXIETY QUESTIONNAIRES: GAD7 TOTAL SCORE: 11

## 2018-08-24 ASSESSMENT — PATIENT HEALTH QUESTIONNAIRE - PHQ9: SUM OF ALL RESPONSES TO PHQ QUESTIONS 1-9: 17

## 2018-08-25 NOTE — PROGRESS NOTES
"Chief complaints:  Epistaxis   Foot Pain    right foot bone spur   Elbow left    mass        Bloody nose at least once a day over the past 2-3 months -- same side always without triggering or history of trauma or other bleeding.  Right foot bone spur painful -- insidious, progressive onset, denies history gout  Left elbow mass painful x2 years has grown    Denies focal weakness, numbness, tingling.    /84  Pulse 92  Temp 98.4  F (36.9  C) (Tympanic)  Resp 16  Ht 5' 11.75\" (1.822 m)  Wt 215 lb (97.5 kg)  BMI 29.36 kg/m2  Note synovial cyst of left elbow, mobile, non-tender, right medial 1st MTP bunion/hypertrophy, and visibly normal nares without more invasive examination  Mood notably depressed without suicidal ideation, intent, or plan     1. Severe episode of recurrent major depressive disorder, without psychotic features (H)  Given his poor responses to many serotonin specific reuptake inhibitors, we discussed obtaining [employer] testing for best chance trials of medications moving forward.  We may ask for psychiatry consultation as well.  He agreed to establish with therapist.  Follow-up for testing then 6 weeks post medication initiation.  Patient agreed with plan and demonstrated understanding to contact us for help if not improving or sooner if worsening or if other questions or concerns arise.  - OTHER ORDERS SCAN  - MENTAL HEALTH REFERRAL  - Adult; Outpatient Treatment; Individual/Couples/Family/Group Therapy/Health Psychology; [facility] [place]: [facility] [phone]; We will contact you to schedule the appointment or please call with any questions    2. LISE (obstructive sleep apnea)  High risk for obstructive sleep apnea.  - SLEEP EVALUATION & MANAGEMENT REFERRAL - ADULT -[facility] [place]  [phone] (Age 15 and up); Future    5. Other atopic dermatitis  Mild on feet  - clobetasol (TEMOVATE) 0.05 % ointment; Apply sparingly to affected area twice daily for 14 " days.  Do not apply to face.  Dispense: 30 g; Refill: 2    6. Pain of toe of right foot  - XR Toe Right G/E 2 Views; Future    7. Screening for prostate cancer  Discussion     8. Epistaxis  Likely a friable culprit vessel -- to see ENT.  Doubt hemophilia.  - OTOLARYNGOLOGY REFERRAL    9. Synovial cyst of left elbow  Minor -- deferred management to upper extremity ortho, appreciate their assistance  - ORTHO  REFERRAL

## 2019-01-15 ENCOUNTER — E-VISIT (OUTPATIENT)
Dept: INTERNAL MEDICINE | Facility: CLINIC | Age: 49
End: 2019-01-15
Payer: COMMERCIAL

## 2019-01-15 DIAGNOSIS — F33.2 SEVERE EPISODE OF RECURRENT MAJOR DEPRESSIVE DISORDER, WITHOUT PSYCHOTIC FEATURES (H): Primary | ICD-10-CM

## 2019-01-15 PROCEDURE — 99207 ZZC NO BILLABLE SERVICE THIS VISIT: CPT | Performed by: INTERNAL MEDICINE

## 2019-01-15 RX ORDER — BUPROPION HYDROCHLORIDE 150 MG/1
150 TABLET ORAL EVERY MORNING
Qty: 7 TABLET | Refills: 0 | Status: SHIPPED | OUTPATIENT
Start: 2019-01-15 | End: 2019-03-26

## 2019-01-15 RX ORDER — BUPROPION HYDROCHLORIDE 300 MG/1
300 TABLET ORAL EVERY MORNING
Qty: 31 TABLET | Refills: 2 | Status: SHIPPED | OUTPATIENT
Start: 2019-01-22 | End: 2019-03-22

## 2019-01-15 ASSESSMENT — ANXIETY QUESTIONNAIRES
7. FEELING AFRAID AS IF SOMETHING AWFUL MIGHT HAPPEN: NOT AT ALL
3. WORRYING TOO MUCH ABOUT DIFFERENT THINGS: SEVERAL DAYS
GAD7 TOTAL SCORE: 1
GAD7 TOTAL SCORE: 1
6. BECOMING EASILY ANNOYED OR IRRITABLE: NOT AT ALL
7. FEELING AFRAID AS IF SOMETHING AWFUL MIGHT HAPPEN: NOT AT ALL
1. FEELING NERVOUS, ANXIOUS, OR ON EDGE: NOT AT ALL
2. NOT BEING ABLE TO STOP OR CONTROL WORRYING: NOT AT ALL
GAD7 TOTAL SCORE: 1
4. TROUBLE RELAXING: NOT AT ALL
5. BEING SO RESTLESS THAT IT IS HARD TO SIT STILL: NOT AT ALL

## 2019-01-15 ASSESSMENT — PATIENT HEALTH QUESTIONNAIRE - PHQ9
SUM OF ALL RESPONSES TO PHQ QUESTIONS 1-9: 8
SUM OF ALL RESPONSES TO PHQ QUESTIONS 1-9: 8
10. IF YOU CHECKED OFF ANY PROBLEMS, HOW DIFFICULT HAVE THESE PROBLEMS MADE IT FOR YOU TO DO YOUR WORK, TAKE CARE OF THINGS AT HOME, OR GET ALONG WITH OTHER PEOPLE: SOMEWHAT DIFFICULT

## 2019-01-16 ASSESSMENT — ANXIETY QUESTIONNAIRES: GAD7 TOTAL SCORE: 1

## 2019-01-16 ASSESSMENT — PATIENT HEALTH QUESTIONNAIRE - PHQ9: SUM OF ALL RESPONSES TO PHQ QUESTIONS 1-9: 8

## 2019-03-22 ENCOUNTER — MYC REFILL (OUTPATIENT)
Dept: OTHER | Age: 49
End: 2019-03-22

## 2019-03-22 DIAGNOSIS — F33.2 SEVERE EPISODE OF RECURRENT MAJOR DEPRESSIVE DISORDER, WITHOUT PSYCHOTIC FEATURES (H): ICD-10-CM

## 2019-03-22 NOTE — TELEPHONE ENCOUNTER
Routing refill request to provider for review/approval because:  Needs provider approval, failed protocol.  PHQ-9 SCORE 4/25/2018 8/22/2018 1/15/2019   PHQ-9 Total Score - - -   PHQ-9 Total Score MyChart - - 8 (Mild depression)   PHQ-9 Total Score 20 17 8

## 2019-03-26 RX ORDER — BUPROPION HYDROCHLORIDE 300 MG/1
300 TABLET ORAL EVERY MORNING
Qty: 31 TABLET | Refills: 2 | Status: SHIPPED | OUTPATIENT
Start: 2019-03-26 | End: 2019-03-26

## 2019-03-26 RX ORDER — BUPROPION HYDROCHLORIDE 300 MG/1
300 TABLET ORAL EVERY MORNING
Qty: 90 TABLET | Refills: 0 | Status: SHIPPED | OUTPATIENT
Start: 2019-03-26 | End: 2019-08-12

## 2019-05-30 ENCOUNTER — E-VISIT (OUTPATIENT)
Dept: INTERNAL MEDICINE | Facility: CLINIC | Age: 49
End: 2019-05-30
Payer: COMMERCIAL

## 2019-05-30 DIAGNOSIS — K12.0 APHTHAE: Primary | ICD-10-CM

## 2019-05-30 PROCEDURE — 99444 ZZC PHYSICIAN ONLINE EVALUATION & MANAGEMENT SERVICE: CPT | Performed by: INTERNAL MEDICINE

## 2019-05-31 RX ORDER — PREDNISONE 20 MG/1
40 TABLET ORAL ONCE
Qty: 2 TABLET | Refills: 0 | Status: SHIPPED | OUTPATIENT
Start: 2019-05-31 | End: 2019-06-18

## 2019-06-18 ENCOUNTER — OFFICE VISIT (OUTPATIENT)
Dept: OTOLARYNGOLOGY | Facility: CLINIC | Age: 49
End: 2019-06-18
Payer: COMMERCIAL

## 2019-06-18 VITALS
BODY MASS INDEX: 29.12 KG/M2 | WEIGHT: 215 LBS | OXYGEN SATURATION: 98 % | DIASTOLIC BLOOD PRESSURE: 89 MMHG | HEART RATE: 96 BPM | HEIGHT: 72 IN | SYSTOLIC BLOOD PRESSURE: 131 MMHG

## 2019-06-18 DIAGNOSIS — R04.0 EPISTAXIS: Primary | ICD-10-CM

## 2019-06-18 PROCEDURE — 99243 OFF/OP CNSLTJ NEW/EST LOW 30: CPT | Mod: 25 | Performed by: OTOLARYNGOLOGY

## 2019-06-18 PROCEDURE — 30901 CONTROL OF NOSEBLEED: CPT | Performed by: OTOLARYNGOLOGY

## 2019-06-18 ASSESSMENT — MIFFLIN-ST. JEOR: SCORE: 1879.26

## 2019-06-18 NOTE — PROGRESS NOTES
I am seeing this patient in consultation for epistaxis at the request of the provider Dr. Rip Garcia.    Chief Complaint - Epistaxis    History of Present Illness - Michael Waters is a 49 year old male presents with approximately a year of epistaxis. It occurs on the right side. It usually only comes out the front of the nose, but it has ran down the back of the throat at times. The bleeding occurs approximately a few times per week. The patient can get the bleeding to stop by pressure. The patient has never gone to the emergency department or required a blood transfusion due to nose bleeding. The patient has no personal or family history of bleeding disorders. The patient takes no blood-thinning medication. The patient has tried nothing.    Past Medical History -   Patient Active Problem List   Diagnosis     CARDIOVASCULAR SCREENING; LDL GOAL LESS THAN 160     Atopic dermatitis     Severe episode of recurrent major depressive disorder, without psychotic features (H)       Current Medications -   Current Outpatient Medications:      buPROPion (WELLBUTRIN XL) 300 MG 24 hr tablet, Take 1 tablet (300 mg) by mouth every morning, Disp: 90 tablet, Rfl: 0     clobetasol (TEMOVATE) 0.05 % ointment, Apply sparingly to affected area twice daily for 14 days.  Do not apply to face., Disp: 30 g, Rfl: 2    Allergies - No Known Allergies    Social History -   Social History     Socioeconomic History     Marital status:      Spouse name: Not on file     Number of children: Not on file     Years of education: Not on file     Highest education level: Not on file   Occupational History     Not on file   Social Needs     Financial resource strain: Not on file     Food insecurity:     Worry: Not on file     Inability: Not on file     Transportation needs:     Medical: Not on file     Non-medical: Not on file   Tobacco Use     Smoking status: Never Smoker     Smokeless tobacco: Never Used   Substance and Sexual Activity     Alcohol  "use: Yes     Comment: occ     Drug use: No     Sexual activity: Yes     Partners: Female   Lifestyle     Physical activity:     Days per week: Not on file     Minutes per session: Not on file     Stress: Not on file   Relationships     Social connections:     Talks on phone: Not on file     Gets together: Not on file     Attends Latter-day service: Not on file     Active member of club or organization: Not on file     Attends meetings of clubs or organizations: Not on file     Relationship status: Not on file     Intimate partner violence:     Fear of current or ex partner: Not on file     Emotionally abused: Not on file     Physically abused: Not on file     Forced sexual activity: Not on file   Other Topics Concern     Parent/sibling w/ CABG, MI or angioplasty before 65F 55M? Not Asked   Social History Narrative    Works for [employer] line    Re-    3 kids       Family History - see HPI    Review of Systems - As per HPI and PMHx, otherwise 7 system review of the head and neck negative.    Physical Exam  /89   Pulse 96   Ht 1.822 m (5' 11.75\")   Wt 97.5 kg (215 lb)   SpO2 98%   BMI 29.36 kg/m    General - The patient is in no distress.  Alert and oriented x3, answers questions and cooperates with examination appropriately.   Voice and Breathing - The patient was breathing comfortably without the use of accessory muscles. There was no wheezing, stridor, or stertor.  The patients voice was clear and strong, with no dysphonia.  Head and Face - Normocephalic and atraumatic.    Eyes - Extraocular movements intact. Sclera were not icteric or injected, conjunctiva were pink and moist.  Neurologic - Cranial nerves II-XII are grossly intact. Specifically, the facial nerve is intact, House-Brackmann grade 1 of 6.   Nose - No significant external deformity. The nasal mucosa along the anterior septum on the right side shows crusting and dried blood. It is dry. There is one " prominent blood vessels superficially located inferiorly. The left side was mostly normal.  The septum was midline, turbinates are of normal size and position.  No polyps, masses, or purulence.  Mouth - Examination of the oral cavity showed pink, healthy oral mucosa. No lesions or ulcerations noted.  The tongue was mobile and protrudes midline.   Oropharynx - The walls of the oropharynx were smooth, symmetric, and had no lesions or ulcerations. The uvula was midline and the palate raised symmetrically. No blood.  Neck -  Palpation of the occipital, submental, submandibular, internal jugular chain, and supraclavicular nodes did not demonstrate any abnormal lymph nodes or masses. The parotid glands were without masses. Palpation of the thyroid was soft and smooth, with no nodules or goiter appreciated.  The trachea was midline.    Procedure - I sprayed the right anterior nasal cavity and septum with phenylephrine and lidocaine. I applied 3 sticks of silver nitrate to the prominent blood vessels along the anterior septum. Hemostasis was achieved. I applied bacitracin ointment to the wound with a q-tip.    A/P - Michael Waters is a 49 year old male with epistaxis. This is almost certainly coming from the right anterior septum. I cauterized this with 3 sticks of silver nitrate. We discussed restrictions on manipulation and picking of the nose.  Also, sleeping with the head elevated, and avoidance of strenuous activity, heavy lifting, and bending over until the septum heals. I explained using vaseline twice daily to the anterior septum, nasal saline spray 3-5 times per day, and a humidifier at the bedside at night.    I also explained applying digital pressure to the nasal tip for a minimum of 15-20 minutes to stop a nose bleed. If that doesn't stop the bleeding the patient needs to return to clinic, or if unavailable, proceed to the nearest emergency department.     Bryson Lucio MD  Otolaryngology  [facility]

## 2019-06-18 NOTE — LETTER
6/18/2019         RE: Michael Waters  [address]  [place] [address]        Dear Colleague,    Thank you for referring your patient, Michael Waters, to the [facility]. Please see a copy of my visit note below.    I am seeing this patient in consultation for epistaxis at the request of the provider Dr. Rip Garcia.    Chief Complaint - Epistaxis    History of Present Illness - Michael Waters is a 49 year old male presents with approximately a year of epistaxis. It occurs on the right side. It usually only comes out the front of the nose, but it has ran down the back of the throat at times. The bleeding occurs approximately a few times per week. The patient can get the bleeding to stop by pressure. The patient has never gone to the emergency department or required a blood transfusion due to nose bleeding. The patient has no personal or family history of bleeding disorders. The patient takes no blood-thinning medication. The patient has tried nothing.    Past Medical History -   Patient Active Problem List   Diagnosis     CARDIOVASCULAR SCREENING; LDL GOAL LESS THAN 160     Atopic dermatitis     Severe episode of recurrent major depressive disorder, without psychotic features (H)       Current Medications -   Current Outpatient Medications:      buPROPion (WELLBUTRIN XL) 300 MG 24 hr tablet, Take 1 tablet (300 mg) by mouth every morning, Disp: 90 tablet, Rfl: 0     clobetasol (TEMOVATE) 0.05 % ointment, Apply sparingly to affected area twice daily for 14 days.  Do not apply to face., Disp: 30 g, Rfl: 2    Allergies - No Known Allergies    Social History -   Social History     Socioeconomic History     Marital status:      Spouse name: Not on file     Number of children: Not on file     Years of education: Not on file     Highest education level: Not on file   Occupational History     Not on file   Social Needs     Financial resource strain: Not on file     Food insecurity:     Worry:  "Not on file     Inability: Not on file     Transportation needs:     Medical: Not on file     Non-medical: Not on file   Tobacco Use     Smoking status: Never Smoker     Smokeless tobacco: Never Used   Substance and Sexual Activity     Alcohol use: Yes     Comment: occ     Drug use: No     Sexual activity: Yes     Partners: Female   Lifestyle     Physical activity:     Days per week: Not on file     Minutes per session: Not on file     Stress: Not on file   Relationships     Social connections:     Talks on phone: Not on file     Gets together: Not on file     Attends Islam service: Not on file     Active member of club or organization: Not on file     Attends meetings of clubs or organizations: Not on file     Relationship status: Not on file     Intimate partner violence:     Fear of current or ex partner: Not on file     Emotionally abused: Not on file     Physically abused: Not on file     Forced sexual activity: Not on file   Other Topics Concern     Parent/sibling w/ CABG, MI or angioplasty before 65F 55M? Not Asked   Social History Narrative    Works for [employer]    Re-    3 kids       Family History - see HPI    Review of Systems - As per HPI and PMHx, otherwise 7 system review of the head and neck negative.    Physical Exam  /89   Pulse 96   Ht 1.822 m (5' 11.75\")   Wt 97.5 kg (215 lb)   SpO2 98%   BMI 29.36 kg/m     General - The patient is in no distress.  Alert and oriented x3, answers questions and cooperates with examination appropriately.   Voice and Breathing - The patient was breathing comfortably without the use of accessory muscles. There was no wheezing, stridor, or stertor.  The patients voice was clear and strong, with no dysphonia.  Head and Face - Normocephalic and atraumatic.    Eyes - Extraocular movements intact. Sclera were not icteric or injected, conjunctiva were pink and moist.  Neurologic - Cranial nerves II-XII are grossly " intact. Specifically, the facial nerve is intact, House-Brackmann grade 1 of 6.   Nose - No significant external deformity. The nasal mucosa along the anterior septum on the right side shows crusting and dried blood. It is dry. There is one prominent blood vessels superficially located inferiorly. The left side was mostly normal.  The septum was midline, turbinates are of normal size and position.  No polyps, masses, or purulence.  Mouth - Examination of the oral cavity showed pink, healthy oral mucosa. No lesions or ulcerations noted.  The tongue was mobile and protrudes midline.   Oropharynx - The walls of the oropharynx were smooth, symmetric, and had no lesions or ulcerations. The uvula was midline and the palate raised symmetrically. No blood.  Neck -  Palpation of the occipital, submental, submandibular, internal jugular chain, and supraclavicular nodes did not demonstrate any abnormal lymph nodes or masses. The parotid glands were without masses. Palpation of the thyroid was soft and smooth, with no nodules or goiter appreciated.  The trachea was midline.    Procedure - I sprayed the right anterior nasal cavity and septum with phenylephrine and lidocaine. I applied 3 sticks of silver nitrate to the prominent blood vessels along the anterior septum. Hemostasis was achieved. I applied bacitracin ointment to the wound with a q-tip.    A/P - Michael Waters is a 49 year old male with epistaxis. This is almost certainly coming from the right anterior septum. I cauterized this with 3 sticks of silver nitrate. We discussed restrictions on manipulation and picking of the nose.  Also, sleeping with the head elevated, and avoidance of strenuous activity, heavy lifting, and bending over until the septum heals. I explained using vaseline twice daily to the anterior septum, nasal saline spray 3-5 times per day, and a humidifier at the bedside at night.    I also explained applying digital pressure to the nasal tip for a  minimum of 15-20 minutes to stop a nose bleed. If that doesn't stop the bleeding the patient needs to return to clinic, or if unavailable, proceed to the nearest emergency department.     Bryson Lucio MD  Otolaryngology  [facility]      Again, thank you for allowing me to participate in the care of your patient.        Sincerely,        Bryson Lucio MD

## 2019-08-13 DIAGNOSIS — F33.2 SEVERE EPISODE OF RECURRENT MAJOR DEPRESSIVE DISORDER, WITHOUT PSYCHOTIC FEATURES (H): ICD-10-CM

## 2019-08-13 NOTE — TELEPHONE ENCOUNTER
Requested Prescriptions   Pending Prescriptions Disp Refills     buPROPion (WELLBUTRIN XL) 300 MG 24 hr tablet 90 tablet 0     Sig: Take 1 tablet (300 mg) by mouth every morning       There is no refill protocol information for this order      Last Written Prescription Date:  4-24-19  Last Fill Quantity: 90,  # refills: 0   Last office visit: No previous visit found with prescribing provider:  ?   Future Office Visit:

## 2019-08-13 NOTE — TELEPHONE ENCOUNTER
ROBI note from patient after asking to up PHQ9.      Michael Waters  You 5 days ago - 8/8/19 4:47pm         Thanks but I think I'm going to just stop taking any meds      Allie Sorto CMA

## 2019-08-13 NOTE — TELEPHONE ENCOUNTER
See 8/8 [employer] message.     Patient states he is going to stop taking medications.    Josephine Dallas RN, BSN, PHN

## 2019-08-15 RX ORDER — BUPROPION HYDROCHLORIDE 300 MG/1
300 TABLET ORAL EVERY MORNING
Qty: 90 TABLET | Refills: 0 | Status: SHIPPED | OUTPATIENT
Start: 2019-08-15 | End: 2021-07-05

## 2020-03-02 ENCOUNTER — HEALTH MAINTENANCE LETTER (OUTPATIENT)
Age: 50
End: 2020-03-02

## 2020-12-14 ENCOUNTER — HEALTH MAINTENANCE LETTER (OUTPATIENT)
Age: 50
End: 2020-12-14

## 2021-04-18 ENCOUNTER — HEALTH MAINTENANCE LETTER (OUTPATIENT)
Age: 51
End: 2021-04-18

## 2021-04-28 ENCOUNTER — IMMUNIZATION (OUTPATIENT)
Dept: PEDIATRICS | Facility: CLINIC | Age: 51
End: 2021-04-28
Payer: COMMERCIAL

## 2021-04-28 PROCEDURE — 0001A PR COVID VAC PFIZER DIL RECON 30 MCG/0.3 ML IM: CPT

## 2021-04-28 PROCEDURE — 91300 PR COVID VAC PFIZER DIL RECON 30 MCG/0.3 ML IM: CPT

## 2021-05-19 ENCOUNTER — IMMUNIZATION (OUTPATIENT)
Dept: PEDIATRICS | Facility: CLINIC | Age: 51
End: 2021-05-19
Attending: INTERNAL MEDICINE
Payer: COMMERCIAL

## 2021-05-19 PROCEDURE — 0002A PR COVID VAC PFIZER DIL RECON 30 MCG/0.3 ML IM: CPT

## 2021-05-19 PROCEDURE — 91300 PR COVID VAC PFIZER DIL RECON 30 MCG/0.3 ML IM: CPT

## 2021-07-05 ENCOUNTER — OFFICE VISIT (OUTPATIENT)
Dept: FAMILY MEDICINE | Facility: CLINIC | Age: 51
End: 2021-07-05
Payer: COMMERCIAL

## 2021-07-05 VITALS
WEIGHT: 211.8 LBS | HEIGHT: 73 IN | BODY MASS INDEX: 28.07 KG/M2 | DIASTOLIC BLOOD PRESSURE: 86 MMHG | SYSTOLIC BLOOD PRESSURE: 122 MMHG | TEMPERATURE: 98.9 F | OXYGEN SATURATION: 98 % | HEART RATE: 108 BPM

## 2021-07-05 DIAGNOSIS — L20.9 ATOPIC DERMATITIS, UNSPECIFIED TYPE: Primary | ICD-10-CM

## 2021-07-05 PROCEDURE — 99213 OFFICE O/P EST LOW 20 MIN: CPT | Performed by: NURSE PRACTITIONER

## 2021-07-05 RX ORDER — CLOBETASOL PROPIONATE 0.5 MG/G
OINTMENT TOPICAL
Qty: 30 G | Refills: 2 | Status: SHIPPED | OUTPATIENT
Start: 2021-07-05 | End: 2022-06-27

## 2021-07-05 RX ORDER — PREDNISONE 20 MG/1
20 TABLET ORAL DAILY
Qty: 5 TABLET | Refills: 0 | Status: SHIPPED | OUTPATIENT
Start: 2021-07-05 | End: 2022-01-31

## 2021-07-05 ASSESSMENT — MIFFLIN-ST. JEOR: SCORE: 1866.66

## 2021-07-05 NOTE — PATIENT INSTRUCTIONS
Thank you for choosing [facility] (formerly [facility]).  You may be receiving an email and/or telephone survey request from [facility] regarding your visit today.  Please take a few minutes to respond to the survey to let us know how we are doing.      If you have questions or concerns, please contact us via [employer] or you can contact your care team at [phone] option 2.    Our Clinic hours are:  Monday - Thursday 7am-6pm  Friday 7am-5pm    The [place] outpatient lab hours are:  Monday - Friday 7am-4:30pm    Appointments are required, call [phone]    If you have clinical questions after hours or would like to schedule an appointment,  call the clinic at [phone].

## 2021-07-05 NOTE — PROGRESS NOTES
Assessment & Plan     Atopic dermatitis, unspecified type  Patient previously saw dermatology   - refilled Clobetasol cream  - predniSONE (DELTASONE) 20 MG tablet; Take 1 tablet (20 mg) by mouth daily      No follow-ups on file.    SHARIF Kinsey [facility] SAMUEL Rodriguez is a 51 year old who presents for the following health issues     HPI     Rash- patient last saw dermatology in 2013- biopsy showed spongiotic dermatitis   Onset/Duration: 15 years ago; 6 months   Description  Location: left leg, left ankle, and right wrist   Character: raised, painful, burning, red  Itching: severe  Intensity:  moderate  Progression of Symptoms:  worsening  Accompanying signs and symptoms:   Fever: no  Body aches or joint pain: no  Sore throat symptoms: no  Recent cold symptoms: no  History:           Previous episodes of similar rash: Yes, [facility] because of on going symptoms.   New exposures:  None  Recent travel: no  Exposure to similar rash: no  Precipitating or alleviating factors: prednisone and clobetasol ointment helps. Scratching at it makes it worse.   Therapies tried and outcome: lotion         Review of Systems   Constitutional, HEENT, cardiovascular, pulmonary, GI, , musculoskeletal, neuro, skin, endocrine and psych systems are negative, except as otherwise noted.      Objective    There were no vitals taken for this visit.  There is no height or weight on file to calculate BMI.  Physical Exam   GENERAL: healthy, alert and no distress  MS: no gross musculoskeletal defects noted, no edema  SKIN: left wrist and posterior left knee with patch of excoriated skin

## 2021-10-02 ENCOUNTER — HEALTH MAINTENANCE LETTER (OUTPATIENT)
Age: 51
End: 2021-10-02

## 2022-01-24 ENCOUNTER — E-VISIT (OUTPATIENT)
Dept: URGENT CARE | Facility: CLINIC | Age: 52
End: 2022-01-24
Payer: COMMERCIAL

## 2022-01-24 ENCOUNTER — HOSPITAL ENCOUNTER (EMERGENCY)
Facility: CLINIC | Age: 52
Discharge: HOME | End: 2022-01-24
Attending: PHYSICIAN ASSISTANT | Admitting: PHYSICIAN ASSISTANT
Payer: COMMERCIAL

## 2022-01-24 VITALS
HEART RATE: 95 BPM | DIASTOLIC BLOOD PRESSURE: 87 MMHG | WEIGHT: 213 LBS | OXYGEN SATURATION: 96 % | SYSTOLIC BLOOD PRESSURE: 146 MMHG | RESPIRATION RATE: 16 BRPM | BODY MASS INDEX: 28.49 KG/M2 | TEMPERATURE: 97.6 F

## 2022-01-24 DIAGNOSIS — U07.1 INFECTION DUE TO 2019 NOVEL CORONAVIRUS: ICD-10-CM

## 2022-01-24 DIAGNOSIS — Z20.822 CLOSE EXPOSURE TO 2019 NOVEL CORONAVIRUS: Primary | ICD-10-CM

## 2022-01-24 DIAGNOSIS — J02.0 STREP THROAT: ICD-10-CM

## 2022-01-24 LAB
ALBUMIN SERPL-MCNC: 3.7 G/DL (ref 3.4–5)
ALP SERPL-CCNC: 74 U/L (ref 40–150)
ALT SERPL W P-5'-P-CCNC: 34 U/L (ref 0–70)
ANION GAP SERPL CALCULATED.3IONS-SCNC: 5 MMOL/L (ref 3–14)
AST SERPL W P-5'-P-CCNC: 15 U/L (ref 0–45)
BASOPHILS # BLD AUTO: 0 10E3/UL (ref 0–0.2)
BASOPHILS NFR BLD AUTO: 0 %
BILIRUB SERPL-MCNC: 0.7 MG/DL (ref 0.2–1.3)
BUN SERPL-MCNC: 26 MG/DL (ref 7–30)
CALCIUM SERPL-MCNC: 9.5 MG/DL (ref 8.5–10.1)
CHLORIDE BLD-SCNC: 111 MMOL/L (ref 94–109)
CO2 SERPL-SCNC: 26 MMOL/L (ref 20–32)
CREAT SERPL-MCNC: 0.87 MG/DL (ref 0.66–1.25)
DEPRECATED S PYO AG THROAT QL EIA: POSITIVE
EOSINOPHIL # BLD AUTO: 0 10E3/UL (ref 0–0.7)
EOSINOPHIL NFR BLD AUTO: 0 %
ERYTHROCYTE [DISTWIDTH] IN BLOOD BY AUTOMATED COUNT: 12.8 % (ref 10–15)
FLUAV RNA SPEC QL NAA+PROBE: NEGATIVE
FLUBV RNA RESP QL NAA+PROBE: NEGATIVE
GFR SERPL CREATININE-BSD FRML MDRD: >90 ML/MIN/1.73M2
GLUCOSE BLD-MCNC: 107 MG/DL (ref 70–99)
HCT VFR BLD AUTO: 50.6 % (ref 40–53)
HGB BLD-MCNC: 17.5 G/DL (ref 13.3–17.7)
HOLD SPECIMEN: NORMAL
IMM GRANULOCYTES # BLD: 0.1 10E3/UL
IMM GRANULOCYTES NFR BLD: 0 %
LYMPHOCYTES # BLD AUTO: 1.1 10E3/UL (ref 0.8–5.3)
LYMPHOCYTES NFR BLD AUTO: 8 %
MCH RBC QN AUTO: 31.2 PG (ref 26.5–33)
MCHC RBC AUTO-ENTMCNC: 34.6 G/DL (ref 31.5–36.5)
MCV RBC AUTO: 90 FL (ref 78–100)
MONOCYTES # BLD AUTO: 0.9 10E3/UL (ref 0–1.3)
MONOCYTES NFR BLD AUTO: 6 %
NEUTROPHILS # BLD AUTO: 11.4 10E3/UL (ref 1.6–8.3)
NEUTROPHILS NFR BLD AUTO: 86 %
NRBC # BLD AUTO: 0 10E3/UL
NRBC BLD AUTO-RTO: 0 /100
PLATELET # BLD AUTO: 248 10E3/UL (ref 150–450)
POTASSIUM BLD-SCNC: 4.1 MMOL/L (ref 3.4–5.3)
PROT SERPL-MCNC: 8.1 G/DL (ref 6.8–8.8)
RBC # BLD AUTO: 5.61 10E6/UL (ref 4.4–5.9)
SARS-COV-2 RNA RESP QL NAA+PROBE: POSITIVE
SODIUM SERPL-SCNC: 142 MMOL/L (ref 133–144)
WBC # BLD AUTO: 13.3 10E3/UL (ref 4–11)

## 2022-01-24 PROCEDURE — 99284 EMERGENCY DEPT VISIT MOD MDM: CPT | Performed by: PHYSICIAN ASSISTANT

## 2022-01-24 PROCEDURE — 36415 COLL VENOUS BLD VENIPUNCTURE: CPT | Performed by: PHYSICIAN ASSISTANT

## 2022-01-24 PROCEDURE — 99284 EMERGENCY DEPT VISIT MOD MDM: CPT | Mod: 25 | Performed by: PHYSICIAN ASSISTANT

## 2022-01-24 PROCEDURE — 250N000011 HC RX IP 250 OP 636: Performed by: PHYSICIAN ASSISTANT

## 2022-01-24 PROCEDURE — 87880 STREP A ASSAY W/OPTIC: CPT | Performed by: PHYSICIAN ASSISTANT

## 2022-01-24 PROCEDURE — 80053 COMPREHEN METABOLIC PANEL: CPT | Performed by: PHYSICIAN ASSISTANT

## 2022-01-24 PROCEDURE — 96374 THER/PROPH/DIAG INJ IV PUSH: CPT | Performed by: PHYSICIAN ASSISTANT

## 2022-01-24 PROCEDURE — 87636 SARSCOV2 & INF A&B AMP PRB: CPT | Performed by: PHYSICIAN ASSISTANT

## 2022-01-24 PROCEDURE — 96361 HYDRATE IV INFUSION ADD-ON: CPT | Performed by: PHYSICIAN ASSISTANT

## 2022-01-24 PROCEDURE — C9803 HOPD COVID-19 SPEC COLLECT: HCPCS | Performed by: PHYSICIAN ASSISTANT

## 2022-01-24 PROCEDURE — 85025 COMPLETE CBC W/AUTO DIFF WBC: CPT | Performed by: PHYSICIAN ASSISTANT

## 2022-01-24 PROCEDURE — 258N000003 HC RX IP 258 OP 636: Performed by: PHYSICIAN ASSISTANT

## 2022-01-24 PROCEDURE — 96372 THER/PROPH/DIAG INJ SC/IM: CPT | Performed by: PHYSICIAN ASSISTANT

## 2022-01-24 PROCEDURE — 99421 OL DIG E/M SVC 5-10 MIN: CPT | Performed by: FAMILY MEDICINE

## 2022-01-24 RX ORDER — DEXAMETHASONE SODIUM PHOSPHATE 10 MG/ML
6 INJECTION, SOLUTION INTRAMUSCULAR; INTRAVENOUS ONCE
Status: COMPLETED | OUTPATIENT
Start: 2022-01-24 | End: 2022-01-24

## 2022-01-24 RX ORDER — SODIUM CHLORIDE 9 MG/ML
INJECTION, SOLUTION INTRAVENOUS CONTINUOUS
Status: DISCONTINUED | OUTPATIENT
Start: 2022-01-24 | End: 2022-01-24 | Stop reason: HOSPADM

## 2022-01-24 RX ADMIN — SODIUM CHLORIDE 1000 ML: 9 INJECTION, SOLUTION INTRAVENOUS at 13:51

## 2022-01-24 RX ADMIN — DEXAMETHASONE SODIUM PHOSPHATE 6 MG: 10 INJECTION, SOLUTION INTRAMUSCULAR; INTRAVENOUS at 13:51

## 2022-01-24 RX ADMIN — PENICILLIN G BENZATHINE AND PENICILLIN G PROCAINE 1.2 MILLION UNITS: 600000; 600000 INJECTION, SUSPENSION INTRAMUSCULAR at 15:33

## 2022-01-24 NOTE — ED TRIAGE NOTES
Pt has sore throat.  Pt is COVID positive.  Pt concerned for strep throat. Pt became lightheaded in triage.  RN coached pt on breathing and gave ice pack for back of neck.  Pt improved.

## 2022-01-24 NOTE — PATIENT INSTRUCTIONS
Dear Michael,    Based on your exposure to COVID-19 (coronavirus), we would like to test you for this virus. I have placed an order for this test. The best time for testing is 5-7 days after the exposure.    How to schedule:  Go to your [employer] home page and scroll down to the section that says  You have an appointment that needs to be scheduled  and click the large green button that says  Schedule Now  and follow the steps to find the next available opening.     If you are unable to complete these [employer] scheduling steps, please call [phone] to schedule your testing.     Monoclonal antibody treatment after exposure:  Because you have been exposed to COVID-19, you may be able to receive a treatment with monoclonal antibodies. This treatment can lower your risk of severe illness and going to the hospital. It is given through an IV or under your skin (subcutaneous) and must be given at an infusion center.   To be eligible, you must be 12 years or older, at least 88 pounds and:    Are not fully vaccinated against COVID-19, OR    Are immunocompromised     If you would like to sign up to be considered to receive the monoclonal antibody medicine, please complete a participation form through the [facility] here:  MNRAP ([email or web address]). You may also call the [facility] COVID-19 Public Hotline at 1-889.617.6672 (open Mon-Fri: 9am-7pm and Sat: 10am-6pm).     Not all people who are eligible will receive the medicine since supply is limited. You will be contacted in the next 1 to 2 business days only if you are selected. If you do not receive a call, you have not been selected to receive the medicine. If you have any questions about this medication, please contact your primary care provider. For more information, see [email or web address]    Return to work/school/ guidance:   Please let your workplace manager and  staffing office know when your quarantine ends. We cannot give you an exact date as it depends on the information below. You can calculate this on your own or work with your manager/staffing office to calculate this.    Quarantine Guidelines:  You are considered exposed if you have been within 6 feet of an infected person(s) for 15 minutes or more over a 24-hour period. Quarantine will start after the LAST time you had contact with the infected person while they were contagious (for example, if you saw someone on Monday and Wednesday, your quarantine would start after Wednesday).     If you have NO symptoms (asymptomatic):    Stay home for 14 days (quarantine) after your LAST contact with a person who has COVID-19 (this remains the CDC recommendation for greatest protection against spread of COVID-19), OR    10-day quarantine with no test, OR    Minimum 7-day quarantine with negative RT-PCR test collected on day 5 or later    Quarantine Guideline exceptions:    People who have been fully vaccinated do not need to quarantine unless they have symptoms. You are considered fully vaccinated 2 weeks after your 2nd dose in a 2-dose series or 2 weeks after a single-dose series. This includes vaccinated health care workers.  o Fully vaccinated people should still get tested 5-7 days after exposure, even if they don t have symptoms.   Note: If you have ongoing exposure to the COVID-positive person, this quarantine period may be more than 14 days. For example, if you continue to be exposed to your child who tested positive and cannot isolate from them, then the quarantine of 7-14 days can't start until your child is no longer contagious. This is typically 10 days from onset of the child's symptoms. So, the total duration may be 17-24 days in this case.   Please contact your doctor if you have questions or call the [facility] Public Hotline: 1-798.977.2189 (Mon-Fri: 9am-7pm and Sat: 10am-6pm).     How to Quarantine:     Monitor your  symptoms until 14 days after your last exposure. If you develop signs or symptoms, isolate and get tested (even if you have been tested already).    Stay home and away from others. Don't go to school or anywhere else. Generally, quarantine means staying home from work but there are some exceptions to this. Please contact your workplace. Cover your mouth and nose with a face covering if you must be around other people.     Wash your hands and face often. Use soap and water.    What are the symptoms of COVID-19?  The most common symptoms are cough, fever and trouble breathing. Less common symptoms include headache, body aches, fatigue (feeling very tired), chills, sore throat, stuffy or runny nose, diarrhea (loose poop), loss of taste or smell, belly pain, and nausea or vomiting (feeling sick to your stomach or throwing up).  If you develop symptoms, follow these guidelines:    If you're normally healthy: Please start another eVisit.    If you have a serious health problem (like cancer, heart failure, an organ transplant or kidney disease): Call your specialty clinic. Let them know that you might have COVID-19.    Where can I get more information?    [facility] - About COVID-19: [email or web address]     CDC - What to Do If You're Sick:     www.cdc.gov/coronavirus/2019-ncov/about/steps-when-sick.html    CDC - Ending Home Isolation:  https://www.cdc.gov/coronavirus/2019-ncov/your-health/quarantine-isolation.html    CDC - Caring for Someone:  www.cdc.gov/coronavirus/2019-ncov/if-you-are-sick/care-for-someone.html    [facility] clinical trials (COVID-19 research studies): [email or web address]    Below are the COVID-19 hotlines at the [facility] ([facility]). Interpreters are available.  o For health questions: Call 988-368-9689 or 1-843.788.5778 (7 am to 7 pm)  o For questions about schools and childcare: Call 923-157-8119 or [phone] (7 a.m. to 7  p.m.)

## 2022-01-24 NOTE — ED PROVIDER NOTES
History     Chief Complaint   Patient presents with     Pharyngitis     Dizziness     HPI  Michael Waters is a 51 year old male who presents to the emergency department with concern over sore throat which been present for the last 3 days.  Patient is a complains of fever up to 102.5, chills, myalgias, headache, loss of taste and smell, nasal congestion, cough, right eye redness, possible wheezing.  He also has had multiple episodes of diarrhea.  He states concern that he has been unable to eat or drink due to his throat discomfort and is concerned about dehydration.  He has had intermittent periods of lightheadedness, he attributes to his decreased oral intake.  He denies any significant shortness of breath, melena, hematochezia.  He did test positive for COVID-19 on home test 1/22/22. He has been vaccinated with pfizer vaccine, more than six months ago, has not received booster dose.      Allergies:  No Known Allergies    Problem List:    Patient Active Problem List    Diagnosis Date Noted     Severe episode of recurrent major depressive disorder, without psychotic features (H) 07/02/2018     Priority: Medium     Atopic dermatitis 03/05/2013     Priority: Medium     CARDIOVASCULAR SCREENING; LDL GOAL LESS THAN 160 12/23/2011     Priority: Medium      Past Medical History:    No past medical history on file.    Past Surgical History:    No past surgical history on file.    Family History:    No family history on file.    Social History:  Marital Status:   [2]  Social History     Tobacco Use     Smoking status: Never Smoker     Smokeless tobacco: Never Used   Substance Use Topics     Alcohol use: Yes     Comment: occ     Drug use: No      Medications:    clobetasol (TEMOVATE) 0.05 % external ointment  predniSONE (DELTASONE) 20 MG tablet      Review of Systems   Constitutional: Positive for activity change, appetite change, chills, fatigue and fever.   HENT: Positive for congestion and sore throat. Negative  for ear pain.    Eyes: Positive for redness. Negative for photophobia, pain, discharge, itching and visual disturbance.   Respiratory: Positive for cough. Negative for shortness of breath and wheezing.    Cardiovascular: Negative for chest pain, palpitations and leg swelling.   Gastrointestinal: Positive for diarrhea, nausea and vomiting. Negative for abdominal pain and blood in stool.   Genitourinary: Negative for dysuria, flank pain, frequency, hematuria and urgency.   Musculoskeletal: Positive for myalgias.   Skin: Negative for color change, rash and wound.   Neurological: Positive for dizziness, light-headedness and headaches. Negative for seizures, syncope, speech difficulty and weakness.     Physical Exam   BP: (!) 160/105  Pulse: 95  Temp: 97.6  F (36.4  C)  Resp: 16  Weight: 96.6 kg (213 lb)  SpO2: 99 %  Physical Exam  GENERAL APPEARANCE: Alert, cooperative, appears in mild discomfort   EYES: EOMI,  PERRL, conjunctiva clear  HENT: ear canals and TM's normal.  Oral mucosa dry.  Posterior pharynx is erythematous without exudate, uvula midline, no trismus or stridor   NECK: supple, nontender, bilateral anterior cervical lymphadenopathy   RESP: lungs clear to auscultation - no rales, rhonchi or wheezes  CV: regular rates and rhythm, normal S1 S2, no murmur noted  ABDOMEN:  soft, nontender, no HSM or masses and bowel sounds normal  NEURO: Normal strength and tone, sensory exam grossly normal,  normal speech and mentation  SKIN: no suspicious lesions or rashes  ED Course           Procedures       Critical Care time:  none        No results found for this or any previous visit (from the past 24 hour(s)).  Medications   0.9% sodium chloride BOLUS (0 mLs Intravenous Stopped 1/24/22 1528)   dexamethasone PF (DECADRON) injection 6 mg (6 mg Intravenous Given 1/24/22 1351)   penicillin G & procaine (BICILLIN-CR) injection 1.2 Million Units (1.2 Million Units Intramuscular Given 1/24/22 1533)     Assessments & Plan (with  Medical Decision Making)     I have reviewed the nursing notes.  I have reviewed the findings, diagnosis, plan and need for follow up with the patient.     Discharge Medication List as of 1/24/2022  3:28 PM        Final diagnoses:   Strep throat   Infection due to 2019 novel coronavirus     51-year-old male who tested positive for COVID-19 through home test several days ago presents to the emergency department with concern for persistent sore throat, fever, chills, myalgias, nausea, vomiting and loose stools with concerns for dehydration.  He had elevated blood pressure upon arrival, isauro vital signs were stable.  Physical exam findings as described above.  As part of evaluation patient did have rapid strep test which was positive for strep throat.  He did have confirmatory PCR testing and did again test positive for COVID-19, negative for influenza.  CBC showed elevated WBC at 13.3, CMP was non-contributory.  Suspect strep throat is driving his fever, nausea and throat discomfort however cannot rule out COVID-19 contributing to the symptoms as well. I do not suspect peritonsillar retropharyngeal abscess, epiglottitis, lucien's angina.  He was given a dose of Decadron for symptomatic treatment of his throat swelling and given IM Bicillin injection to treat strep.  He tolerated IV fluids.  He was discharged home stable with instructions for continued monitoring.  Follow up if no resolution of fever in 48-72 hours.  Worrisome reasons to return to ER/UC sooner discussed.     Disclaimer: This note consists of symbols derived from keyboarding, dictation, and/or voice recognition software. As a result, there may be errors in the script that have gone undetected.  Please consider this when interpreting information found in the chart.      1/24/2022   [facility] EMERGENCY DEPT     Carla Rader PA-C  01/25/22 2946

## 2022-01-24 NOTE — ED NOTES
DATE:  1/24/2022   TIME OF RECEIPT FROM LAB:  4687  LAB TEST:  covid  LAB VALUE:  positive  RESULTS GIVEN WITH READ-BACK TO (PROVIDER):  Carla Rader PA-C  TIME LAB VALUE REPORTED TO PROVIDER:   6820

## 2022-01-25 ASSESSMENT — ENCOUNTER SYMPTOMS
WEAKNESS: 0
PALPITATIONS: 0
SORE THROAT: 1
NAUSEA: 1
BLOOD IN STOOL: 0
APPETITE CHANGE: 1
COUGH: 1
ACTIVITY CHANGE: 1
PHOTOPHOBIA: 0
WOUND: 0
EYE DISCHARGE: 0
MYALGIAS: 1
SHORTNESS OF BREATH: 0
EYE REDNESS: 1
FATIGUE: 1
EYE ITCHING: 0
DIZZINESS: 1
WHEEZING: 0
FREQUENCY: 0
FEVER: 1
CHILLS: 1
DIARRHEA: 1
VOMITING: 1
COLOR CHANGE: 0
FLANK PAIN: 0
HEMATURIA: 0
HEADACHES: 1
SPEECH DIFFICULTY: 0
LIGHT-HEADEDNESS: 1
SEIZURES: 0
EYE PAIN: 0
DYSURIA: 0
ABDOMINAL PAIN: 0

## 2022-01-31 ENCOUNTER — MYC REFILL (OUTPATIENT)
Dept: FAMILY MEDICINE | Facility: CLINIC | Age: 52
End: 2022-01-31
Payer: COMMERCIAL

## 2022-01-31 DIAGNOSIS — L20.9 ATOPIC DERMATITIS, UNSPECIFIED TYPE: ICD-10-CM

## 2022-02-01 NOTE — TELEPHONE ENCOUNTER
Routing refill request to provider for review/approval because:  Drug not on the [facility] refill protocol states having same rash?? Nicole HERNANDEZ RN

## 2022-02-02 RX ORDER — PREDNISONE 20 MG/1
20 TABLET ORAL DAILY
Qty: 5 TABLET | Refills: 0 | Status: SHIPPED | OUTPATIENT
Start: 2022-02-02 | End: 2022-06-27

## 2022-03-04 ENCOUNTER — IMMUNIZATION (OUTPATIENT)
Dept: NURSING | Facility: CLINIC | Age: 52
End: 2022-03-04
Payer: COMMERCIAL

## 2022-03-04 PROCEDURE — 0054A COVID-19,PF,PFIZER (12+ YRS): CPT

## 2022-03-04 PROCEDURE — 91305 COVID-19,PF,PFIZER (12+ YRS): CPT

## 2022-05-14 ENCOUNTER — HEALTH MAINTENANCE LETTER (OUTPATIENT)
Age: 52
End: 2022-05-14

## 2022-06-27 ENCOUNTER — VIRTUAL VISIT (OUTPATIENT)
Dept: FAMILY MEDICINE | Facility: CLINIC | Age: 52
End: 2022-06-27
Payer: COMMERCIAL

## 2022-06-27 DIAGNOSIS — L20.9 ATOPIC DERMATITIS, UNSPECIFIED TYPE: ICD-10-CM

## 2022-06-27 PROCEDURE — 99213 OFFICE O/P EST LOW 20 MIN: CPT | Mod: 95 | Performed by: INTERNAL MEDICINE

## 2022-06-27 RX ORDER — PREDNISONE 20 MG/1
20 TABLET ORAL DAILY
Qty: 7 TABLET | Refills: 0 | Status: SHIPPED | OUTPATIENT
Start: 2022-06-27 | End: 2022-07-04

## 2022-06-27 RX ORDER — CLOBETASOL PROPIONATE 0.5 MG/G
OINTMENT TOPICAL
Qty: 60 G | Refills: 0 | Status: SHIPPED | OUTPATIENT
Start: 2022-06-27 | End: 2024-03-11

## 2022-06-27 ASSESSMENT — ENCOUNTER SYMPTOMS
RHINORRHEA: 0
MYALGIAS: 0
DYSURIA: 0
UNEXPECTED WEIGHT CHANGE: 0
CHILLS: 0
ARTHRALGIAS: 0
HEADACHES: 0
FEVER: 0
ABDOMINAL DISTENTION: 0
SHORTNESS OF BREATH: 0
COUGH: 0
FATIGUE: 0

## 2022-06-27 NOTE — PROGRESS NOTES
"Michael is a 52 year old who is being evaluated via a billable video visit.      How would you like to obtain your AVS? MyChart  If the video visit is dropped, the invitation should be resent by: Text to cell phone: [phone]  Will anyone else be joining your video visit? No          Assessment & Plan     Atopic dermatitis, unspecified type  Patient has had recurrent dermatitis for many years.  In 2013 was evaluated by dermatology including biopsy with a diagnosis of eczema.  He is used Temovate and occasionally prednisone..  Has been having a prolonged flare it is not currently on any medication.  Discussed use of sedating and nonsedating antihistamines for itch.  Benadryl tends to work quite well but causes significant sedation.  - clobetasol (TEMOVATE) 0.05 % external ointment; Apply sparingly to affected area twice daily for 30 days.  Do not apply to face.  - predniSONE (DELTASONE) 20 MG tablet; Take 1 tablet (20 mg) by mouth daily for 7 days  - Adult Dermatology Referral; Future             BMI:   Estimated body mass index is 28.49 kg/m  as calculated from the following:    Height as of 7/5/21: 1.842 m (6' 0.5\").    Weight as of 1/24/22: 96.6 kg (213 lb).   Weight management plan: Patient was referred to their PCP to discuss a diet and exercise plan.        No follow-ups on file.    Westley Boyle MD  [facility]    Subjective   Michael is a 52 year old accompanied by his self., presenting for the following health issues:  Derm Problem (Eczema all over body. )      HPI   Patient complains of recurrent pruritic rash generally forearms and legs below the knee.  He has had it for many years.  Has been treated periodically with topical steroids and prednisone.  He has been having more trouble since he had COVID earlier this year.  2013 was evaluated by dermatology and a diagnosis of eczema made his evaluation included biopsy.  Patient has had no other recent illnesses.  The rash is " intensely pruritic.        Review of Systems   Constitutional: Negative for chills, fatigue, fever and unexpected weight change.   HENT: Negative for congestion and rhinorrhea.    Respiratory: Negative for cough and shortness of breath.    Cardiovascular: Negative for chest pain.   Gastrointestinal: Negative for abdominal distention.   Genitourinary: Negative for dysuria.   Musculoskeletal: Negative for arthralgias and myalgias.   Neurological: Negative for headaches.            Objective           Vitals:  No vitals were obtained today due to virtual visit.    Physical Exam   GENERAL: Healthy, alert and no distress  EYES: Eyes grossly normal to inspection.  No discharge or erythema, or obvious scleral/conjunctival abnormalities.  RESP: No audible wheeze, cough, or visible cyanosis.  No visible retractions or increased work of breathing.    SKIN: Visible skin clear. No significant rash, abnormal pigmentation or lesions.  NEURO: Cranial nerves grossly intact.  Mentation and speech appropriate for age.  PSYCH: Mentation appears normal, affect normal/bright, judgement and insight intact, normal speech and appearance well-groomed.                Video-Visit Details    Video Start Time: 3:50    Type of service:  Video Visit    Video End Time:4:02 PM    Originating Location (pt. Location): Home    Distant Location (provider location):  [facility]     Platform used for Video Visit: [employer]    .  ..

## 2022-09-03 ENCOUNTER — HEALTH MAINTENANCE LETTER (OUTPATIENT)
Age: 52
End: 2022-09-03

## 2023-02-18 ENCOUNTER — APPOINTMENT (OUTPATIENT)
Dept: GENERAL RADIOLOGY | Facility: CLINIC | Age: 53
End: 2023-02-18
Attending: PHYSICIAN ASSISTANT
Payer: COMMERCIAL

## 2023-02-18 ENCOUNTER — HOSPITAL ENCOUNTER (EMERGENCY)
Facility: CLINIC | Age: 53
Discharge: HOME | End: 2023-02-18
Attending: PHYSICIAN ASSISTANT | Admitting: PHYSICIAN ASSISTANT
Payer: COMMERCIAL

## 2023-02-18 VITALS
DIASTOLIC BLOOD PRESSURE: 86 MMHG | HEART RATE: 100 BPM | RESPIRATION RATE: 20 BRPM | SYSTOLIC BLOOD PRESSURE: 140 MMHG | TEMPERATURE: 99.4 F | OXYGEN SATURATION: 96 %

## 2023-02-18 DIAGNOSIS — U07.1 INFECTION DUE TO 2019 NOVEL CORONAVIRUS: ICD-10-CM

## 2023-02-18 LAB
DEPRECATED S PYO AG THROAT QL EIA: NEGATIVE
FLUAV RNA SPEC QL NAA+PROBE: NEGATIVE
FLUBV RNA RESP QL NAA+PROBE: NEGATIVE
GROUP A STREP BY PCR: NOT DETECTED
RSV RNA SPEC NAA+PROBE: NEGATIVE
SARS-COV-2 RNA RESP QL NAA+PROBE: POSITIVE

## 2023-02-18 PROCEDURE — C9803 HOPD COVID-19 SPEC COLLECT: HCPCS | Performed by: PHYSICIAN ASSISTANT

## 2023-02-18 PROCEDURE — G0463 HOSPITAL OUTPT CLINIC VISIT: HCPCS | Mod: CS,25 | Performed by: PHYSICIAN ASSISTANT

## 2023-02-18 PROCEDURE — 87637 SARSCOV2&INF A&B&RSV AMP PRB: CPT | Performed by: PHYSICIAN ASSISTANT

## 2023-02-18 PROCEDURE — 87651 STREP A DNA AMP PROBE: CPT | Performed by: PHYSICIAN ASSISTANT

## 2023-02-18 PROCEDURE — 99214 OFFICE O/P EST MOD 30 MIN: CPT | Mod: CS | Performed by: PHYSICIAN ASSISTANT

## 2023-02-18 PROCEDURE — 71046 X-RAY EXAM CHEST 2 VIEWS: CPT

## 2023-02-18 ASSESSMENT — ENCOUNTER SYMPTOMS
FEVER: 1
FATIGUE: 1
SORE THROAT: 1
COUGH: 1
GASTROINTESTINAL NEGATIVE: 1
MYALGIAS: 1
HEADACHES: 1
RHINORRHEA: 1

## 2023-02-18 ASSESSMENT — ACTIVITIES OF DAILY LIVING (ADL): ADLS_ACUITY_SCORE: 35

## 2023-02-18 NOTE — DISCHARGE INSTRUCTIONS
Increase fluids, rest, Tylenol and ibuprofen over-the-counter as needed for symptoms.  Can use over-the-counter Mucinex.    Prescription Paxlovid to start today for treatment of COVID-19.  You are contagious until you are fever free for 24 hours off of Tylenol and ibuprofen and then you need to wear a mask for 5 days after that    To the emergency department if shortness of breath, chest pain, heart racing or skipping beats, fevers lasting another 2 to 3 days, change or worsening of symptoms.    Rapid strep today was negative.  Throat culture sent and currently pending

## 2023-02-18 NOTE — ED NOTES
"         MASSBP Score 2/18/2023   Age Greater than or equal to 65 years 0   BMI greater than or equal to 35 kg/m2 0   Has Diabetes Mellitus 0   Has Chronic Kidney Disease 0   Has Cardiovascular Disease and 55 years or older 0   Has Chronic Respiratory Disease and 55 years or older 0   Has Hypertension and 55 years or older 0   Is Immunocompromised 0   Is Pregnant 0   Member of [facility] community (Black/, /, ,  or , or  or [place] Native)  0   MASSBP Score 0   Has the patient had a positive COVID test outside our system?  No   What day did symptoms start?  2/13/2023       Estimated body mass index is 28.49 kg/m  as calculated from the following:    Height as of 7/5/21: 1.842 m (6' 0.5\").    Weight as of 1/24/22: 96.6 kg (213 lb).     GFR Estimate   Date Value Ref Range Status   01/24/2022 >90 >60 mL/min/1.73m2 Final     Comment:     Effective December 21, 2021 eGFRcr in adults is calculated using the 2021 CKD-EPI creatinine equation which includes age and gender (Herman et al., NEJM, DOI: 10.1056/ZDKSzb5981954)        FDA Facts Sheet  Lexicomp Drug Interaction review    Medications were reviewed with the patient and held or adjusted where applicable.    No current facility-administered medications for this encounter.     Current Outpatient Medications   Medication     nirmatrelvir and ritonavir (PAXLOVID) therapy pack     clobetasol (TEMOVATE) 0.05 % external ointment                                       "

## 2023-02-18 NOTE — ED PROVIDER NOTES
History     Chief Complaint   Patient presents with     Generalized Body Aches     Pharyngitis     Cough     HPI  Michael Waters is a 52 year old male who presents today with 5 days of body aches, fever, runny nose, congestion, cough, sore throat and headache.  Patient denies any known exposures.  He is vaccinated against COVID-19.  He denies any shortness of breath, chest pain, abdominal pain, nausea or vomiting.    Allergies:  No Known Allergies    Problem List:    Patient Active Problem List    Diagnosis Date Noted     Severe episode of recurrent major depressive disorder, without psychotic features (H) 07/02/2018     Priority: Medium     Atopic dermatitis 03/05/2013     Priority: Medium     CARDIOVASCULAR SCREENING; LDL GOAL LESS THAN 160 12/23/2011     Priority: Medium        Past Medical History:    No past medical history on file.    Past Surgical History:    No past surgical history on file.    Family History:    No family history on file.    Social History:  Marital Status:   [2]  Social History     Tobacco Use     Smoking status: Never     Smokeless tobacco: Never   Substance Use Topics     Alcohol use: Yes     Comment: occ     Drug use: No        Medications:    nirmatrelvir and ritonavir (PAXLOVID) therapy pack  clobetasol (TEMOVATE) 0.05 % external ointment          Review of Systems   Constitutional: Positive for fatigue and fever.   HENT: Positive for congestion, rhinorrhea and sore throat.    Respiratory: Positive for cough.    Gastrointestinal: Negative.    Musculoskeletal: Positive for myalgias.   Skin: Negative.    Neurological: Positive for headaches.   All other systems reviewed and are negative.      Physical Exam   BP: (!) 140/86  Pulse: 100  Temp: 99.4  F (37.4  C)  Resp: 20  SpO2: 96 %      Physical Exam  Vitals and nursing note reviewed.   Constitutional:       General: He is not in acute distress.     Appearance: Normal appearance. He is normal weight. He is ill-appearing. He is  not toxic-appearing or diaphoretic.   HENT:      Right Ear: Tympanic membrane and ear canal normal.      Left Ear: Tympanic membrane and ear canal normal.      Mouth/Throat:      Mouth: Mucous membranes are moist.      Pharynx: Posterior oropharyngeal erythema present. No oropharyngeal exudate.      Comments: +2-3 bilateral tonsillar enlargement  Eyes:      General: No scleral icterus.     Extraocular Movements: Extraocular movements intact.      Conjunctiva/sclera: Conjunctivae normal.      Pupils: Pupils are equal, round, and reactive to light.   Cardiovascular:      Rate and Rhythm: Normal rate and regular rhythm.      Heart sounds: Normal heart sounds.   Pulmonary:      Effort: Pulmonary effort is normal.      Breath sounds: Normal breath sounds.   Musculoskeletal:      Cervical back: Normal range of motion and neck supple.   Skin:     General: Skin is warm.      Capillary Refill: Capillary refill takes less than 2 seconds.   Neurological:      General: No focal deficit present.      Mental Status: He is alert and oriented to person, place, and time.   Psychiatric:         Mood and Affect: Mood normal.         Behavior: Behavior normal.         Thought Content: Thought content normal.         Judgment: Judgment normal.         ED Course                 Procedures             Critical Care time:  none               Results for orders placed or performed during the hospital encounter of 02/18/23 (from the past 24 hour(s))   Symptomatic Influenza A/B & SARS-CoV2 (COVID-19) Virus PCR Multiplex Nasopharyngeal    Specimen: Nasopharyngeal; Swab   Result Value Ref Range    Influenza A PCR Negative Negative    Influenza B PCR Negative Negative    RSV PCR Negative Negative    SARS CoV2 PCR Positive (A) Negative    Narrative    Testing was performed using the Xpert Xpress CoV2/Flu/RSV Assay on the [employer] GeneXpert Instrument. This test should be ordered for the detection of SARS-CoV-2 and influenza viruses in individuals  who meet clinical and/or epidemiological criteria. Test performance is unknown in asymptomatic patients. This test is for in vitro diagnostic use under the FDA EUA for laboratories certified under CLIA to perform high or moderate complexity testing. This test has not been FDA cleared or approved. A negative result does not rule out the presence of PCR inhibitors in the specimen or target RNA in concentration below the limit of detection for the assay. If only one viral target is positive but coinfection with multiple targets is suspected, the sample should be re-tested with another FDA cleared, approved, or authorized test, if coinfection would change clinical management. This test was validated by the [facility] [employer]. These laboratories are certified under the Clinical Laboratory Improvement Amendments of 1988 (CLIA-88) as qualified to perform high complexity laboratory testing.   Streptococcus A Rapid Scr w Reflx to PCR    Specimen: Throat; Swab   Result Value Ref Range    Group A Strep antigen Negative Negative   XR Chest 2 Views    Narrative    EXAM: XR CHEST 2 VIEWS  LOCATION: [facility]  DATE/TIME: 02/18/2023, 11:42 AM    INDICATION: Fevers for five days with cough.  COMPARISON: None.      Impression    IMPRESSION: Negative chest.           Medications - No data to display    Assessments & Plan (with Medical Decision Making)     I have reviewed the nursing notes.    I have reviewed the findings, diagnosis, plan and need for follow up with the patient.    Michael Waters is a 52 year old male who presents today with 5 days of body aches, fever, runny nose, congestion, cough, sore throat and headache.  Patient denies any known exposures.  He is vaccinated against COVID-19.  He denies any shortness of breath, chest pain, abdominal pain, nausea or vomiting.    Exam findings above.  Rapid strep obtained today was negative.  Throat culture sent and currently pending.  COVID  test came back positive today.  Chest x-ray was negative.  Patient does not have any significant risk factors but due to fevers being 5 days and due to his age we will treat with Paxlovid.  Patient not currently on any medication over-the-counter or prescription.  Discussed that he can continue Tylenol and ibuprofen over-the-counter for fevers, Mucinex for cough and nasal saline sprays.  Side effects of Paxlovid discussed with patient and given on discharge paperwork.  Patient discharged in stable condition.  Patient go to the emergency shortness of breath, chest pain, heart racing or skipping beats, change or worsening of symptoms or fevers that last another 2 to 3 days      Discharge Medication List as of 2/18/2023 12:33 PM      START taking these medications    Details   nirmatrelvir and ritonavir (PAXLOVID) therapy pack Take 3 tablets by mouth 2 times daily for 5 days Take 2 Nirmatrelvir tablets and 1 Ritonavir tablet twice daily for 5 days., Disp-30 tablet, R-0, E-PrescribeIf Paxlovid unavailable and no Molnupiravir ordered, refer patient to MNRAP at [email or web address]. If Molnupiravir ordered along with Paxlovid, dispense Molnupiravir and review embryotoxicity.             Final diagnoses:   Infection due to 2019 novel coronavirus       2/18/2023   [facility] EMERGENCY DEPT

## 2023-02-19 ENCOUNTER — PATIENT OUTREACH (OUTPATIENT)
Dept: CARE COORDINATION | Facility: CLINIC | Age: 53
End: 2023-02-19
Payer: COMMERCIAL

## 2023-02-19 NOTE — PROGRESS NOTES
Clinic Care Coordination Contact    Referral Type: Virtual Home Monitoring - Epic Care Companion    Patient referred for Virtual Home Monitoring Program for either COVID-19 or Community Acquired Pneumonia diagnosis following recent [facility] ED visit.  Epic Care Companion referral was also placed by provider.    Criteria for Virtual Home Monitoring telephone outreach is not met after review of ED encounter/ED provider note because:    1) ED provider note indicates assessment was negative for respiratory distress. O2 sats were stable throughout course of ED visit per chart review.      2) Patient was not discharged with new supplemental oxygen.    Per notes, ED provider and/or ED care team discussed appropriate follow up guidelines with patient prior to discharge or reflected these instructions on AVS.       Care Companion team remains available to support patient via [employer] account once activated by patient.     Estrella Goodwin RN  [facility]  - RN Care Coordinator

## 2023-03-07 ENCOUNTER — APPOINTMENT (OUTPATIENT)
Dept: GENERAL RADIOLOGY | Facility: CLINIC | Age: 53
End: 2023-03-07
Attending: NURSE PRACTITIONER
Payer: COMMERCIAL

## 2023-03-07 ENCOUNTER — HOSPITAL ENCOUNTER (EMERGENCY)
Facility: CLINIC | Age: 53
Discharge: HOME | End: 2023-03-07
Attending: NURSE PRACTITIONER | Admitting: NURSE PRACTITIONER
Payer: COMMERCIAL

## 2023-03-07 VITALS
SYSTOLIC BLOOD PRESSURE: 141 MMHG | HEART RATE: 100 BPM | RESPIRATION RATE: 20 BRPM | DIASTOLIC BLOOD PRESSURE: 85 MMHG | OXYGEN SATURATION: 96 % | TEMPERATURE: 98 F

## 2023-03-07 DIAGNOSIS — J20.9 ACUTE BRONCHITIS: ICD-10-CM

## 2023-03-07 PROCEDURE — 99213 OFFICE O/P EST LOW 20 MIN: CPT | Performed by: NURSE PRACTITIONER

## 2023-03-07 PROCEDURE — G0463 HOSPITAL OUTPT CLINIC VISIT: HCPCS | Mod: 25 | Performed by: NURSE PRACTITIONER

## 2023-03-07 PROCEDURE — 71046 X-RAY EXAM CHEST 2 VIEWS: CPT

## 2023-03-07 RX ORDER — ALBUTEROL SULFATE 90 UG/1
2 AEROSOL, METERED RESPIRATORY (INHALATION) EVERY 6 HOURS PRN
Qty: 18 G | Refills: 0 | Status: SHIPPED | OUTPATIENT
Start: 2023-03-07 | End: 2024-03-11

## 2023-03-07 RX ORDER — BENZONATATE 200 MG/1
200 CAPSULE ORAL 3 TIMES DAILY PRN
Qty: 30 CAPSULE | Refills: 0 | Status: SHIPPED | OUTPATIENT
Start: 2023-03-07 | End: 2024-03-11

## 2023-03-07 RX ORDER — PREDNISONE 20 MG/1
TABLET ORAL
Qty: 10 TABLET | Refills: 0 | Status: SHIPPED | OUTPATIENT
Start: 2023-03-07 | End: 2024-03-11

## 2023-03-07 ASSESSMENT — ENCOUNTER SYMPTOMS
SORE THROAT: 0
LIGHT-HEADEDNESS: 0
TROUBLE SWALLOWING: 0
CHILLS: 0
EYE DISCHARGE: 0
FATIGUE: 1
SINUS PRESSURE: 0
EYE REDNESS: 0
VOMITING: 0
ARTHRALGIAS: 0
WEAKNESS: 0
SHORTNESS OF BREATH: 1
NUMBNESS: 0
MYALGIAS: 0
HEADACHES: 0
RHINORRHEA: 0
DIZZINESS: 0
NAUSEA: 0
SINUS PAIN: 0
ABDOMINAL PAIN: 0
FEVER: 0
COUGH: 1
JOINT SWELLING: 0
WHEEZING: 1

## 2023-03-07 ASSESSMENT — ACTIVITIES OF DAILY LIVING (ADL): ADLS_ACUITY_SCORE: 35

## 2023-03-07 NOTE — ED TRIAGE NOTES
Pt reports he had covid few weeks ago and has not had relief of symptoms. Cough , congestion and headaches are still present.

## 2023-03-07 NOTE — ED PROVIDER NOTES
History     Chief Complaint   Patient presents with     Covid Concern     Nasal Congestion     HPI  Michael Waters is a 52 year old male who presents to the urgent care for evaluation of ongoing nasal congestion, fatigue, cough with wheezing and shortness of breath. Using OTC medications as needed with limited improvement. Nonsmoker. Diagnosed with Covid 2/18/23. No known recent sick contacts. Denies fever, headache, dizziness, sore throat, chest pain, abdominal pain, nausea, vomiting, diarrhea, dysuria, hematuria and rash.    Allergies:  No Known Allergies    Problem List:    Patient Active Problem List    Diagnosis Date Noted     Severe episode of recurrent major depressive disorder, without psychotic features (H) 07/02/2018     Priority: Medium     Atopic dermatitis 03/05/2013     Priority: Medium     CARDIOVASCULAR SCREENING; LDL GOAL LESS THAN 160 12/23/2011     Priority: Medium        Past Medical History:    No past medical history on file.    Past Surgical History:    No past surgical history on file.    Family History:    No family history on file.    Social History:  Marital Status:   [2]  Social History     Tobacco Use     Smoking status: Never     Smokeless tobacco: Never   Substance Use Topics     Alcohol use: Yes     Comment: occ     Drug use: No        Medications:    acetaminophen-codeine (TYLENOL #3) 300-30 MG tablet  albuterol (PROAIR HFA/PROVENTIL HFA/VENTOLIN HFA) 108 (90 Base) MCG/ACT inhaler  benzonatate (TESSALON) 200 MG capsule  predniSONE (DELTASONE) 20 MG tablet  clobetasol (TEMOVATE) 0.05 % external ointment          Review of Systems   Constitutional: Positive for fatigue. Negative for chills and fever.   HENT: Positive for congestion. Negative for ear discharge, ear pain, rhinorrhea, sinus pressure, sinus pain, sore throat and trouble swallowing.    Eyes: Negative for discharge and redness.   Respiratory: Positive for cough, shortness of breath and wheezing.    Cardiovascular:  Negative for chest pain.   Gastrointestinal: Negative for abdominal pain, nausea and vomiting.   Musculoskeletal: Negative for arthralgias, joint swelling and myalgias.   Skin: Negative for rash.   Neurological: Negative for dizziness, weakness, light-headedness, numbness and headaches.   All other systems reviewed and are negative.      Physical Exam   BP: (!) 141/85  Pulse: 100  Temp: 98  F (36.7  C)  Resp: 20  SpO2: 96 %      Physical Exam  Constitutional:       General: He is not in acute distress.     Appearance: He is well-developed. He is ill-appearing. He is not toxic-appearing or diaphoretic.   Eyes:      Conjunctiva/sclera: Conjunctivae normal.      Pupils: Pupils are equal, round, and reactive to light.   Cardiovascular:      Rate and Rhythm: Normal rate and regular rhythm.      Pulses: Normal pulses.   Pulmonary:      Effort: Pulmonary effort is normal. No respiratory distress.      Breath sounds: Normal air entry. No decreased air movement. Wheezing present. No decreased breath sounds or rhonchi.   Musculoskeletal:         General: Normal range of motion.      Cervical back: Normal range of motion and neck supple.   Skin:     General: Skin is warm.      Capillary Refill: Capillary refill takes less than 2 seconds.   Neurological:      General: No focal deficit present.      Mental Status: He is alert and oriented to person, place, and time.   Psychiatric:         Mood and Affect: Mood normal.       ED Course                 Procedures         Results for orders placed or performed during the hospital encounter of 03/07/23 (from the past 24 hour(s))   Chest XR,  PA & LAT    Narrative    CHEST TWO VIEWS 3/7/2023 1:44 PM     HISTORY: ongoing cough    COMPARISON: 2/18/2023       Impression    IMPRESSION: The cardiac silhouette is within normal limits. No  evidence for infiltrate or effusion. No significant bony  abnormalities.    KRISTY LOPEZ MD         SYSTEM ID:  [id number]       Medications - No  data to display    Assessments & Plan (with Medical Decision Making)   Michael Waters is a 52 year old male who presents to the urgent care for evaluation of ongoing nasal congestion, fatigue, cough with wheezing and shortness of breath. Slightly hypertensive, remaining vitals normal. Chest xray negative.  Exam consistent with viral bronchitis. Discussed viral etiology of symptoms and average course of viral illness. Would like to try prednisone, albuterol and benzonatate. Tylenol 3 provided, discussed safe use of this medication. Antibiotics not indicated at this time. May use over the counter medications as needed and appropriate. Increase rest and hydration. Return precautions reviewed, all questions answered. Patient is agreeable to plan of care and discharged in good condition.      I have reviewed the nursing notes.    I have reviewed the findings, diagnosis, plan and need for follow up with the patient.    New Prescriptions    ACETAMINOPHEN-CODEINE (TYLENOL #3) 300-30 MG TABLET    Take 1 tablet by mouth every 6 hours as needed for severe pain (7-10)    ALBUTEROL (PROAIR HFA/PROVENTIL HFA/VENTOLIN HFA) 108 (90 BASE) MCG/ACT INHALER    Inhale 2 puffs into the lungs every 6 hours as needed for shortness of breath or wheezing    BENZONATATE (TESSALON) 200 MG CAPSULE    Take 1 capsule (200 mg) by mouth 3 times daily as needed for cough    PREDNISONE (DELTASONE) 20 MG TABLET    Take two tablets (= 40mg) each day for 5 (five) days       Final diagnoses:   Acute bronchitis       3/7/2023   [facility] EMERGENCY DEPT     Gabrielle Harris, SHARIF CNP  03/07/23 [id number]

## 2023-04-13 ENCOUNTER — APPOINTMENT (OUTPATIENT)
Dept: GENERAL RADIOLOGY | Facility: CLINIC | Age: 53
End: 2023-04-13
Attending: PHYSICIAN ASSISTANT
Payer: COMMERCIAL

## 2023-04-13 ENCOUNTER — HOSPITAL ENCOUNTER (EMERGENCY)
Facility: CLINIC | Age: 53
Discharge: HOME | End: 2023-04-13
Attending: PHYSICIAN ASSISTANT | Admitting: PHYSICIAN ASSISTANT
Payer: COMMERCIAL

## 2023-04-13 VITALS
TEMPERATURE: 98.5 F | HEART RATE: 94 BPM | RESPIRATION RATE: 16 BRPM | SYSTOLIC BLOOD PRESSURE: 154 MMHG | OXYGEN SATURATION: 97 % | DIASTOLIC BLOOD PRESSURE: 92 MMHG

## 2023-04-13 DIAGNOSIS — M20.011 MALLET DEFORMITY OF RIGHT RING FINGER: ICD-10-CM

## 2023-04-13 PROCEDURE — 99213 OFFICE O/P EST LOW 20 MIN: CPT | Performed by: PHYSICIAN ASSISTANT

## 2023-04-13 PROCEDURE — 29130 APPL FINGER SPLINT STATIC: CPT | Mod: F8 | Performed by: PHYSICIAN ASSISTANT

## 2023-04-13 PROCEDURE — 73140 X-RAY EXAM OF FINGER(S): CPT | Mod: RT

## 2023-04-13 PROCEDURE — G0463 HOSPITAL OUTPT CLINIC VISIT: HCPCS | Mod: 25 | Performed by: PHYSICIAN ASSISTANT

## 2023-04-13 ASSESSMENT — ENCOUNTER SYMPTOMS
SHORTNESS OF BREATH: 0
FEVER: 0
ARTHRALGIAS: 0
ABDOMINAL PAIN: 0
NECK STIFFNESS: 0
DIFFICULTY URINATING: 0
EYE REDNESS: 0
HEADACHES: 0
COLOR CHANGE: 0
CONFUSION: 0

## 2023-04-13 NOTE — DISCHARGE INSTRUCTIONS
Wear the splint until seen by orthopedic specialist.    Ortho referral placed for hand specialist    Tylenol over-the-counter as needed for pain

## 2023-04-13 NOTE — ED PROVIDER NOTES
History     Chief Complaint   Patient presents with     Hand Pain     HPI  Michael Waters is a 52 year old male who presents today with right 4th finger flexed at the DIP joint. Patient denies known injury, but states he can not extend the finger at the DIP joint. No significant pain, swelling, bruising, redness, or numbness.     Allergies:  No Known Allergies    Problem List:    Patient Active Problem List    Diagnosis Date Noted     Severe episode of recurrent major depressive disorder, without psychotic features (H) 07/02/2018     Priority: Medium     Atopic dermatitis 03/05/2013     Priority: Medium     CARDIOVASCULAR SCREENING; LDL GOAL LESS THAN 160 12/23/2011     Priority: Medium        Past Medical History:    History reviewed. No pertinent past medical history.    Past Surgical History:    History reviewed. No pertinent surgical history.    Family History:    History reviewed. No pertinent family history.    Social History:  Marital Status:   [2]  Social History     Tobacco Use     Smoking status: Never     Smokeless tobacco: Never   Substance Use Topics     Alcohol use: Yes     Comment: occ     Drug use: No        Medications:    albuterol (PROAIR HFA/PROVENTIL HFA/VENTOLIN HFA) 108 (90 Base) MCG/ACT inhaler  benzonatate (TESSALON) 200 MG capsule  clobetasol (TEMOVATE) 0.05 % external ointment  predniSONE (DELTASONE) 20 MG tablet      Review of Systems   Constitutional: Negative for fever.   HENT: Negative for congestion.    Eyes: Negative for redness.   Respiratory: Negative for shortness of breath.    Cardiovascular: Negative for chest pain.   Gastrointestinal: Negative for abdominal pain.   Genitourinary: Negative for difficulty urinating.   Musculoskeletal: Negative for arthralgias and neck stiffness.        Loss of extension in the right 4th finger at the DIP joint   Skin: Negative for color change.   Neurological: Negative for headaches.   Psychiatric/Behavioral: Negative for confusion.   All  other systems reviewed and are negative.      Physical Exam   BP: (!) 154/92  Pulse: 94  Temp: 98.5  F (36.9  C)  Resp: 16  SpO2: 97 %      Physical Exam  Vitals and nursing note reviewed.   Constitutional:       General: He is not in acute distress.     Appearance: Normal appearance. He is normal weight. He is not ill-appearing or toxic-appearing.   Eyes:      General: No scleral icterus.     Pupils: Pupils are equal, round, and reactive to light.   Cardiovascular:      Pulses: Normal pulses.   Musculoskeletal:      Comments: No tenderness with palpation to bilateral hands.  Patient neurovascularly intact to bilateral upper extremities.  Patient has full range of motion in the fingers of both hands except for the right fourth finger at the DIP joint he is unable to extend.  The finger is Held in a flexed position at the DIP joint.  No tenderness with palpation.  No swelling or erythema or known injury.   Skin:     General: Skin is warm.      Capillary Refill: Capillary refill takes less than 2 seconds.      Findings: No bruising, erythema or rash.   Neurological:      General: No focal deficit present.      Mental Status: He is alert and oriented to person, place, and time.   Psychiatric:         Mood and Affect: Mood normal.         Behavior: Behavior normal.         Thought Content: Thought content normal.         Judgment: Judgment normal.         ED Course                 Procedures             Critical Care time:  none               Results for orders placed or performed during the hospital encounter of 04/13/23 (from the past 24 hour(s))   XR Finger Right G/E 2 Views    Narrative    FINGER RIGHT TWO OR MORE VIEWS   DATE/TIME: 4/13/2023 3:55 PM     INDICATION: Right finger pain.   COMPARISON: None.      Impression    IMPRESSION:  1.  Normal joint spacing and alignment.  2.  No fracture.  3.  Tiny corticated ossicle in the ulnar prestyloid recess.    EMMANUEL RAINES MD         SYSTEM ID:  [id number]        Medications - No data to display    Assessments & Plan (with Medical Decision Making)     I have reviewed the nursing notes.    I have reviewed the findings, diagnosis, plan and need for follow up with the patient.    Michael Waters is a 52 year old male who presents today with right 4th finger flexed at the DIP joint. Patient denies known injury, but states he can not extend the finger at the DIP joint. No significant pain, swelling, bruising, redness, or numbness.     Patient placed in a mallet finger splint and given Ortho referral for further evaluation management.  X-ray obtained today and showed normal joint spacing and alignment.  No fracture.  Tiny corticoid ossicle in the ulnar prestyloid recess.  Patient neurovascularly intact.  Patient unable to extend at the DIP joint of the fourth finger of the right hand.  Patient in agreement with plan to follow-up with orthopedic specialist and to wear splint until seen by them.  Patient discharged in stable condition.      Discharge Medication List as of 4/13/2023  4:06 PM          Final diagnoses:   Mallet deformity of right ring finger       4/13/2023   [facility] EMERGENCY DEPT     Jovita Bermudez PA-C  04/13/23 2034

## 2023-04-14 NOTE — TELEPHONE ENCOUNTER
DIAGNOSIS: Mallet deformity of right ring finger [M20.011]    APPOINTMENT DATE: 04/17/2023   NOTES STATUS DETAILS   DISCHARGE REPORT from the ER Internal 04/13/2023 - [facility]   MEDICATION LIST Internal    XRAYS (IMAGES & REPORTS) Internal 04/13/2023 - [facility]

## 2023-04-17 ENCOUNTER — PRE VISIT (OUTPATIENT)
Dept: ORTHOPEDICS | Facility: CLINIC | Age: 53
End: 2023-04-17

## 2023-04-17 ENCOUNTER — OFFICE VISIT (OUTPATIENT)
Dept: ORTHOPEDICS | Facility: CLINIC | Age: 53
End: 2023-04-17
Attending: PHYSICIAN ASSISTANT
Payer: COMMERCIAL

## 2023-04-17 DIAGNOSIS — M20.011 MALLET DEFORMITY OF RIGHT RING FINGER: ICD-10-CM

## 2023-04-17 PROCEDURE — 99243 OFF/OP CNSLTJ NEW/EST LOW 30: CPT | Performed by: STUDENT IN AN ORGANIZED HEALTH CARE EDUCATION/TRAINING PROGRAM

## 2023-04-17 NOTE — NURSING NOTE
Reason For Visit:   Chief Complaint   Patient presents with     Consult     Right ring finger mallet deformity        Primary MD: Noelle Ref-Primary, Physician  Ref. MD: Lara PACE    Age: 52 year old    ?  No      There were no vitals taken for this visit.      Pain Assessment  Patient Currently in Pain: Yes  0-10 Pain Scale: 0 (no pain at rest)  Primary Pain Location: Finger (Comment which one) (Right ring finger)  Pain Descriptors: Intermittent    Hand Dominance Evaluation  Hand Dominance: Right          QuickDASH Assessment       View : No data to display.                   Current Outpatient Medications   Medication Sig Dispense Refill     albuterol (PROAIR HFA/PROVENTIL HFA/VENTOLIN HFA) 108 (90 Base) MCG/ACT inhaler Inhale 2 puffs into the lungs every 6 hours as needed for shortness of breath or wheezing 18 g 0     benzonatate (TESSALON) 200 MG capsule Take 1 capsule (200 mg) by mouth 3 times daily as needed for cough 30 capsule 0     clobetasol (TEMOVATE) 0.05 % external ointment Apply sparingly to affected area twice daily for 30 days.  Do not apply to face. 60 g 0     predniSONE (DELTASONE) 20 MG tablet Take two tablets (= 40mg) each day for 5 (five) days (Patient not taking: Reported on 4/13/2023) 10 tablet 0       No Known Allergies    BRISSA YOU, ATC

## 2023-04-17 NOTE — PROGRESS NOTES
"[facility]opa[facility] H&P NOTE:  Date: Apr 17, 2023    Patient Name: Michael Waters  MRN: [id number]    CHIEF COMPLAINT: Right ring finger mallet injury    Dominant Hand: Right  Occupation: Manager at [employer]      HPI:  Mr. Michael Waters is a 52 year old male right hand dominant who presents with right ring finger mallet deformity. Referred by Jovita Bermudez PA-C for a consult. Patient estimates that he injured it about a week and a half ago.  He pulled off his work glove and went to put his hand in his pocket, after which he noticed the extensor lag.  States it was \"floppy.\" No pain at that time. XRs taken 4/13/23 demonstrating no fracture. He was given a staxx splint at that time.  Has been taking it on and off daily since the 13th to wash his hands.  He also took it off for several hours over the weekend while doing work around his house.    PAST MEDICAL HISTORY:  No past medical history on file.    PAST SURGICAL HISTORY:  No past surgical history on file.    MEDICATIONS:  Current Outpatient Medications   Medication     albuterol (PROAIR HFA/PROVENTIL HFA/VENTOLIN HFA) 108 (90 Base) MCG/ACT inhaler     benzonatate (TESSALON) 200 MG capsule     clobetasol (TEMOVATE) 0.05 % external ointment     predniSONE (DELTASONE) 20 MG tablet     No current facility-administered medications for this visit.       ALLERGIES:   No Known Allergies    FAMILY HISTORY:  No pertinent family history    SOCIAL HISTORY:  Social History     Tobacco Use     Smoking status: Never     Smokeless tobacco: Never   Substance Use Topics     Alcohol use: Yes     Comment: occ     Drug use: No       The patient's past medical, family, and social history was reviewed and confirmed.    REVIEW OF SYMPTOMS:      General: Negative   Eyes: Negative   Ear, Nose and Throat: Negative   Respiratory: Negative   Cardiovascular: Negative   Gastrointestinal: Negative   Genito-urinary: Negative   Musculoskeletal: " Negative  Neurological: Negative   Psychological: Negative  HEME: Negative   ENDO: Negative   SKIN: Negative    VITALS:  There were no vitals filed for this visit.    EXAM:  General: NAD, A&Ox3  HEENT: NC/AT  CV: RRR by peripheral pulse  Pulmonary: Non-labored breathing on RA  RUE:  30 degree extensor lag DIP joint right ring finger  Nontender, minimal swelling  Intact FDP/FDS  SILT m/r/u  WWP CR< 2s    IMAGING:    X-rays 4/13/23 reviewed demonstrating no abnormality    I have personally reviewed the above images and labs.         IMPRESSION AND RECOMMENDATIONS:  Mr. Michael Waters is a 52 year old male right hand dominant with right ring finger soft tissue mallet injury.    Advised full-time extension splinting of the DIP joint for 8 weeks, followed by 6 weeks of nighttime wear.  This includes hygiene.  He may begin gentle range of motion exercises when he transitions to nighttime wear.    Hand therapy referral placed for custom DIP extension splint.    Advised that he may always notice a slight extensor lag at the DIP joint once terminal extensor tendon is healed. This will be of no functional consequence.    If he has problems after completing the splinting, recurrence of lag, or hyperextension at PIP, I instructed him to return to see me. Otherwise f/u as needed.    Dago Roberts MD    Hand, Upper Extremity & Microvascular Surgery  Department [facility]  [facility]

## 2023-04-17 NOTE — LETTER
"    4/17/2023         RE: Michael Waters  [id number]  [place] [id number]        Dear Colleague,    Thank you for referring your patient, Michael Waters, to the [facility] [place]. Please see a copy of my visit note below.    Orthopaedic Surgery [facility] Extremity Clinic H&P NOTE:  Date: Apr 17, 2023    Patient Name: Michael Waters  MRN: [id number]    CHIEF COMPLAINT: Right ring finger mallet injury    Dominant Hand: Right  Occupation: Manager at [employer]      HPI:  Mr. Michael Waters is a 52 year old male right hand dominant who presents with right ring finger mallet deformity. Referred by Jovita Bermudez PA-C for a consult. Patient estimates that he injured it about a week and a half ago.  He pulled off his work glove and went to put his hand in his pocket, after which he noticed the extensor lag.  States it was \"floppy.\" No pain at that time. XRs taken 4/13/23 demonstrating no fracture. He was given a staxx splint at that time.  Has been taking it on and off daily since the 13th to wash his hands.  He also took it off for several hours over the weekend while doing work around his house.    PAST MEDICAL HISTORY:  No past medical history on file.    PAST SURGICAL HISTORY:  No past surgical history on file.    MEDICATIONS:  Current Outpatient Medications   Medication    albuterol (PROAIR HFA/PROVENTIL HFA/VENTOLIN HFA) 108 (90 Base) MCG/ACT inhaler    benzonatate (TESSALON) 200 MG capsule    clobetasol (TEMOVATE) 0.05 % external ointment    predniSONE (DELTASONE) 20 MG tablet     No current facility-administered medications for this visit.       ALLERGIES:   No Known Allergies    FAMILY HISTORY:  No pertinent family history    SOCIAL HISTORY:  Social History     Tobacco Use    Smoking status: Never    Smokeless tobacco: Never   Substance Use Topics    Alcohol use: Yes     Comment: occ    Drug use: No       The patient's past medical, family, and social history was reviewed " and confirmed.    REVIEW OF SYMPTOMS:      General: Negative   Eyes: Negative   Ear, Nose and Throat: Negative   Respiratory: Negative   Cardiovascular: Negative   Gastrointestinal: Negative   Genito-urinary: Negative   Musculoskeletal: Negative  Neurological: Negative   Psychological: Negative  HEME: Negative   ENDO: Negative   SKIN: Negative    VITALS:  There were no vitals filed for this visit.    EXAM:  General: NAD, A&Ox3  HEENT: NC/AT  CV: RRR by peripheral pulse  Pulmonary: Non-labored breathing on RA  RUE:  30 degree extensor lag DIP joint right ring finger  Nontender, minimal swelling  Intact FDP/FDS  SILT m/r/u  WWP CR< 2s    IMAGING:    X-rays 4/13/23 reviewed demonstrating no abnormality    I have personally reviewed the above images and labs.         IMPRESSION AND RECOMMENDATIONS:  Mr. Michael Waters is a 52 year old male right hand dominant with right ring finger soft tissue mallet injury.    Advised full-time extension splinting of the DIP joint for 8 weeks, followed by 6 weeks of nighttime wear.  This includes hygiene.  He may begin gentle range of motion exercises when he transitions to nighttime wear.    Hand therapy referral placed for custom DIP extension splint.    Advised that he may always notice a slight extensor lag at the DIP joint once terminal extensor tendon is healed. This will be of no functional consequence.    If he has problems after completing the splinting, recurrence of lag, or hyperextension at PIP, I instructed him to return to see me. Otherwise f/u as needed.    Dago Roberts MD    Hand, Upper Extremity & Microvascular Surgery  Department of Orthopaedic Surgery  [facility]

## 2023-04-24 ENCOUNTER — THERAPY VISIT (OUTPATIENT)
Dept: OCCUPATIONAL THERAPY | Facility: CLINIC | Age: 53
End: 2023-04-24
Attending: STUDENT IN AN ORGANIZED HEALTH CARE EDUCATION/TRAINING PROGRAM
Payer: COMMERCIAL

## 2023-04-24 DIAGNOSIS — M20.011 MALLET DEFORMITY OF RIGHT RING FINGER: ICD-10-CM

## 2023-04-24 PROCEDURE — 97535 SELF CARE MNGMENT TRAINING: CPT | Mod: GO

## 2023-04-24 PROCEDURE — 97760 ORTHOTIC MGMT&TRAING 1ST ENC: CPT | Mod: GO

## 2023-04-24 PROCEDURE — 97165 OT EVAL LOW COMPLEX 30 MIN: CPT | Mod: GO

## 2023-04-24 NOTE — PROGRESS NOTES
[facility] Hand Therapy Initial Evaluation     Current Date: 4/24/2023    Diagnosis: R RF mallet finger   DOI: 4/13/23  Referring Provider: Dr. Dago Roberts    Subjective:  Pt reports no pain at time of injury. He noticed injury following work on 4/13/23. Pt arrives today w/ Stax splint in place which MD provided. He notes difficulty with writing and typing. He also notes difficulty with heavier household activities. PMH is as follows:     No past medical history on file.  No past surgical history on file.   No Known Allergies    Per chart, pt to splint for 8 weeks, then complete overnight wear of splint for 6 additional weeks.     Occupational Profile Information:  Right hand dominant  Prior functional level:  no limitations  Patient reports symptoms of edema  Special tests:  x-ray.    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     Objective:  Pain Level (Scale 0-10):   4/24/2023   At Rest 0   With Use NA - splinted     Pain Description:  Date 4/24/2023   Location ring finger   Pain Quality Tender   Frequency intermittent     Pain is worst  daytime   Exacerbated by  if bumped   Relieved by rest   Progression improving     Edema (Circumference measured in cm)   4/24/2023 4/24/2023   Ring Finger L R   P1 7.4 8.0   PIP 6.6 7.0   P2 6.1 6.9     Sensation  WNL throughout all nerve distributions; per patient report    ROM  Contraindicated at this time    Strength  Contraindicated at this time     Assessment:  Patient presents with symptoms consistent with diagnosis of right ring finger mallet,  with conservative intervention.     Patient's limitations or Problem List includes:  Pain, Increased edema, Decreased  and Decreased coordination of the right ring finger which interferes with the patient's ability to  perform Work Tasks, Recreational Activities, self-care activities and Household Chores as compared to previous level of function.    Rehab Potential:  Excellent - Return to full activity, no limitations    Patient will benefit from skilled Occupational Therapy to increase ROM,  strength and coordination and decrease pain and edema to return to previous activity level and resume normal daily tasks and to reach their rehab potential.    Barriers to Learning:  No barrier    Communication Issues:  Patient appears to be able to clearly communicate and understand verbal and written communication and follow directions correctly.    Chart Review: Chart Review and Simple history review with patient    Identified Performance Deficits: dressing, hygiene and grooming, home establishment and management, shopping, work and leisure activities    Assessment of Occupational Performance:  3-5 Performance Deficits    Clinical Decision Making (Complexity): Low complexity    Treatment Explanation:  The following has been discussed with the patient:  RX ordered/plan of care  Anticipated outcomes  Possible risks and side effects    Plan:  Frequency:  1x/week for initial 2 weeks, then follow-up 4 weeks later and every week thereafter  Duration:  for 8 weeks    Treatment Plan:   Modalities:  US and Paraffin  Therapeutic Exercise:  AROM, PROM, Tendon Gliding, Reverse Blocking, Isotonics and Isometrics  Neuromuscular re-education:  Coordination/Dexterity and Kinesiotaping  Manual Techniques:  Joint mobilization  Orthotic Fabrication:  Static  Self Care:  Self Care Tasks, Ergonomic Considerations and Work Tasks  Discharge Plan:  Achieve all LTG.  Independent in home treatment program.  Reach maximal therapeutic benefit.    Home Exercise Program:  Mallet Finger orthosis, full time, tip kept straight during that time  Edema mgmt - hand elevation     Next Visit:  Check fit of orthosis, review precautions and wearing schedule

## 2023-05-03 ENCOUNTER — THERAPY VISIT (OUTPATIENT)
Dept: OCCUPATIONAL THERAPY | Facility: CLINIC | Age: 53
End: 2023-05-03
Payer: COMMERCIAL

## 2023-05-03 DIAGNOSIS — M20.011 MALLET DEFORMITY OF RIGHT RING FINGER: Primary | ICD-10-CM

## 2023-05-03 PROCEDURE — 97763 ORTHC/PROSTC MGMT SBSQ ENC: CPT | Mod: GO | Performed by: OCCUPATIONAL THERAPIST

## 2023-06-03 ENCOUNTER — HEALTH MAINTENANCE LETTER (OUTPATIENT)
Age: 53
End: 2023-06-03

## 2023-06-19 ENCOUNTER — THERAPY VISIT (OUTPATIENT)
Dept: OCCUPATIONAL THERAPY | Facility: CLINIC | Age: 53
End: 2023-06-19
Payer: COMMERCIAL

## 2023-06-19 DIAGNOSIS — M25.641 STIFFNESS OF FINGER JOINT, RIGHT: ICD-10-CM

## 2023-06-19 PROCEDURE — 97763 ORTHC/PROSTC MGMT SBSQ ENC: CPT | Mod: GO | Performed by: OCCUPATIONAL THERAPIST

## 2023-06-19 PROCEDURE — 97110 THERAPEUTIC EXERCISES: CPT | Mod: GO | Performed by: OCCUPATIONAL THERAPIST

## 2023-08-21 VITALS
WEIGHT: 220 LBS | HEIGHT: 73 IN | BODY MASS INDEX: 29.16 KG/M2 | SYSTOLIC BLOOD PRESSURE: 138 MMHG | TEMPERATURE: 98.2 F | RESPIRATION RATE: 16 BRPM | HEART RATE: 98 BPM | DIASTOLIC BLOOD PRESSURE: 74 MMHG | OXYGEN SATURATION: 97 %

## 2023-08-21 PROCEDURE — 99285 EMERGENCY DEPT VISIT HI MDM: CPT | Mod: 25 | Performed by: EMERGENCY MEDICINE

## 2023-08-21 PROCEDURE — 99285 EMERGENCY DEPT VISIT HI MDM: CPT | Performed by: EMERGENCY MEDICINE

## 2023-08-22 ENCOUNTER — HOSPITAL ENCOUNTER (EMERGENCY)
Facility: CLINIC | Age: 53
Discharge: HOME | End: 2023-08-22
Attending: EMERGENCY MEDICINE | Admitting: EMERGENCY MEDICINE
Payer: COMMERCIAL

## 2023-08-22 DIAGNOSIS — F32.A DEPRESSION WITH SUICIDAL IDEATION: ICD-10-CM

## 2023-08-22 DIAGNOSIS — R45.851 SUICIDAL IDEATION: ICD-10-CM

## 2023-08-22 DIAGNOSIS — R45.851 DEPRESSION WITH SUICIDAL IDEATION: ICD-10-CM

## 2023-08-22 LAB
AMPHETAMINES UR QL SCN: ABNORMAL
BARBITURATES UR QL SCN: ABNORMAL
BENZODIAZ UR QL SCN: ABNORMAL
BZE UR QL SCN: ABNORMAL
CANNABINOIDS UR QL SCN: ABNORMAL
OPIATES UR QL SCN: ABNORMAL
PCP QUAL URINE (ROCHE): ABNORMAL

## 2023-08-22 PROCEDURE — 80307 DRUG TEST PRSMV CHEM ANLYZR: CPT | Performed by: EMERGENCY MEDICINE

## 2023-08-22 PROCEDURE — 90791 PSYCH DIAGNOSTIC EVALUATION: CPT

## 2023-08-22 RX ORDER — ACETAMINOPHEN 325 MG/1
650 TABLET ORAL EVERY 4 HOURS PRN
Status: DISCONTINUED | OUTPATIENT
Start: 2023-08-22 | End: 2023-08-22 | Stop reason: HOSPADM

## 2023-08-22 RX ORDER — OLANZAPINE 5 MG/1
10 TABLET, ORALLY DISINTEGRATING ORAL 2 TIMES DAILY
Status: DISCONTINUED | OUTPATIENT
Start: 2023-08-22 | End: 2023-08-22 | Stop reason: HOSPADM

## 2023-08-22 ASSESSMENT — ACTIVITIES OF DAILY LIVING (ADL)
ADLS_ACUITY_SCORE: 35

## 2023-08-22 NOTE — ED NOTES
"Pt tearful and quiet during rooming process. Voluntarily changed into  scrubs and placed belongings in bag to be locked up. Admits to alcohol use today states his last drink was \"hours ago.\"  "

## 2023-08-22 NOTE — DISCHARGE INSTRUCTIONS
Return to the emergency department if you are having thoughts about hurting yourself or anyone else.  Please follow-up in clinic to establish a primary doctor.  Please also follow-up with the mental health referral placed to take neck steps to meet with therapy and possibly start medications for depression.    If you are worried anymore about hurting yourself do not hesitate to call the suicide prevention hotline: 988      Aftercare Plan  Date: Wednesday, 8/23/2023  Time: 2:00 pm - 3:00 pm  Provider: Luci Alberto MS  Location: [email or web address], [address] [place] [address], [address] [address]  Phone: [phone]  Type: Teletherapy    Scheduling Instructions  PLEASE DO NOT IGNORE THESE INSTRUCTIONS WE ARE UNWILLING TO MEET WITH PATIENTS WHOSE SCHEDULERS DO NOT PROVIDE AN EMAIL ADDRESS,INSURANCE INFO, AND AN ACTIVE PHONE NUMBER.WE ARE ALSO UNWILLING TO MEET WITH PATIENTS WHO DO NOT WANT TO GO TO COUNSELING :/ WE DON T MEAN TO BE RUDE, PLEASE JUST READ NOTES:) PLEASE GET EMAIL ADDRESS FROM CLIENT I work under the supervision of Carla Elena MA, LMFT. PLEASE LEAVE AT LEAST A 24 HOUR WINDOW FOR SCHEDULING.WE DO NOT MEET WITH CLIENTS THAT DON T HAVE INS    Date: Wednesday, 8/23/2023  Time: 3:00 pm - 4:00 pm  Provider: Zack TALLEY  CNP,RN  Location: [facility], [address] [place] [address], [address], [place] [address]  Phone: [phone]  Type: Telepsychiatry    Scheduling Instructions  Hello, we are now offering Telehealth appointments. Please make sure patient has a device (smartphone, tablet, computer, etc.) that can handle video calls. Once patient is put on schedule, ask them to fill New Patient Form by using following link: [email or web address] at least 24 hours prior to appointment. Please avoid same day appointments and schedule ATLEAST 24 HOURS ahead of time . Also please note that we ONLY SEE [place] RESIDENTS . NO INTERPRETATION SERVICES AVAILABLE.  Patient  Instructions  Please fill New Patient Form by using following link. All forms need to be completed 24hours prior to the appointment date/time by going to [email or web address] Please call us on [phone] 24 hours prior to your scheduled appointment to confirm that you are able to attend.    If I am feeling unsafe or I am in a crisis, I will:   Contact my established care providers   Call the National Suicide Prevention Lifeline: 988  Go to the nearest emergency room   Call 911     Changes I can make to support my mental health and wellness: See a therapist and someone for medication management.     People in my life that I can ask for help: Family members, friends.     Your [facility] has a mental health crisis team you can call 24/7: [facility] Crisis Phone Line  Phone: [phone]    Other things that are important when I'm in crisis: N/A    Additional resources and information:     [place]  Support group for family members.  [place] website  Phone: [phone]    Alcoholics Anonymous (AA)  Alcoholics Anonymous [place] website  Phone: 401.873.7372    Substance Abuse Resources     It is recommended that you abstain from all mood altering chemicals. Please contact the sober support hotline (335-809-5952) as needed; phones are answered 24 hours a day, 7 days a week.      To access substance abuse treatment you must have an assessment completed within 30 days of starting any program. Information for this to be completed and to secure funding if you have medical assistance or no insurance can be found through your [facility]'s [employer] health intake line. If you have private insurance, call the customer service number on the back of your insurance card to find an in-network substance abuse assessment. The ideal provider will be a treatment facility, licensed in the state of [place].     For those with [place] Medicaid you will need a Rule 25 assessment: The following are phone numbers for each [facility]. Rule 25  assessments must be completed by your current county of residence. Once approved for funding you can connect with a facility that does Rule 25 assessment.     [place] - [phone]     [facility] [place] - [phone]     [facility] [phone]     [place]  [phone]     [facility] [phone]     [place] - [phone]     [facility] [phone]     [place] - [phone]     The following facilities also offer Rule 25/chemical health assessments:     [facility]  [phone]  Mon-Friday: [address] [facility] [facility], [address]  Saturday:  [address], [place], [address]  M-F assessments (7a-1:45p); Saturday assessments (7:45-10:45a)     [facility]  [phone]  [address] [place], [place], [address]  *by appointment only M-W; walk ins available Fridays from 10-2.     [facility]  [phone] (phone consultation available 24/7)  In-person Assessments: [address]", [address] [address]  [address] [place] [address]  [address] [place] [address]  [address] [place] [address]     [facility]  [phone]  [address] [facility],  [place], [place], [address]  *walk in and appointments available by calling     [facility]  [phone] [address] [place], [place], [address]  *by appointment only M-Th      [facility]  [phone]  [address] [place], [place], [address]  *walk ins available M-F     [facility]  [phone] [address] [place], [place], [address]  *available by appointments only     [facility]  [phone] [address] [place], [place], [address]  *available by appointment only     [facility] (ADAP)  [phone]  [address] [place] [address], [address]  *Walk in assessments also available M-F starting at 8 am.     [facility]  [phone] [address], [place], [address]  *available by appointments     [facility]  [phone]  [address] [facility], [place], [place], [address]  *walk in assessments available M-F starting at 7 am.     [facility]  [phone]  [facility]  [address], [place], [place], [address]  *Walk in assessments availble M-F starting at 8 am OR [phone]     [facility]  [address] , [place] [address]  *Walk in assessments available M-F starting at 8 am     [facility]  [phone]  [address] [place] [address]     [facility]  [phone]  [address] [place], [place], [address]  *available by appointment only     [facility]  [phone]  [address] [facility] [address], [place], [address]     [facility]  [phone]  [address] [facility], [place], [place], [address]  *available by appointment     [facility]  [phone]  [address], [place], [place], [address]  *walk ins available M-TH from 9-3     [facility]  [phone]  [address], [place], [address]     [facility]  [phone] [address] [place] [address]  [address] [address], [place] [address]     [facility] (Does Rule 25 Assessments)  [email or web address]  [phone]  [address] [place] [address], [address], [place] [address]       [facility]  [email or web address]  [phone], [address], [place], [place] [id number] [phone], [address], [place], [place] [address] [phone], [address] [facility], [address], [place] [address]  [phone], [address], ([facility]), [place], [place] [id number] [phone], [address] [address] [place] [address]        If you are intoxicated, you may be required to detox at a detox facility  before starting treatment. The following are detox facilities that you can self present to. All detox facilities are able to help you complete an assessment/rule 25 prior to discharge if you choose:       [facility]: [address] [place], [place], [address] [phone]     [facility]: [address] [place], [place], [address]  [phone]     [facility]: [address] [place], [place], [address] [phone]     [facility]: [address] [place], [place], [address] [phone]     [facility]: [address] [place], [place], [address] [phone]           Ways to help cope with sobriety:     Take prescribed medicines as scheduled     Keep follow-up appointments     Talk to others about your concerns     Get regular exercise     Practice deep breathing skills     Eat a healthy diet     Use community resources, including hotline numbers, [facility] crisis and support meetings     Stay sober and avoid places/people/things associated with substance use     Maintain a daily schedule/routine     Get at least 7-8 hours of sleep per night     Create a list 10--20 healthy activities that you can do that are enjoyable and do not involve substance use     Create daily goals (approx. 1-4 goals) per day and work to achieve them throughout the day     [place] Recovery Connection ([facility]): [facility] connects people seeking recovery to resources that help foster and sustain long-term recovery. Whether you are seeking resources for treatment, transportation, housing, job training, education, health care or other pathways to recovery, [facility] is a great place to start. Phone: [phone]. www.[place]RapidMind.JumpChat (Great listing of all types of recovery and non-recovery related resources)     [facility] Recovery: https://www.smartrecEndoGastric Solutionsy.org/       Alcoholics Anonymous: 1-800-ALCOHOL  HTTP://WWW.AA.ORG/  AA [place] (873-697-8825 or http://a[place].org)  AA [place] (687-916-8522 or  www.aastpaul.org)     Narcotics Anonymous: 283.246.5988  www.naminTravora Networksota.us.     [facility]: [address] [place] [address], [address], [place], [place]  [phone]  Drop-in Hours: Monday-Friday 9-11:30 am. By appointment at other times.  [facility] is a drop-in center on the east side [facility] that provides a safe space for individuals who are homeless and have a history of chemical use. Sobriety is not a requirement but drugs and alcohol are not allowed on the property.  Non-clients can access drop-in services such as Recovery and Harm Reduction Groups, referrals to case management, community activities, shower facilities, and a pool table. Individuals who are homeless and have chemical health needs may be eligible for enrollment into [facility]'s case management program. Clients and  work together to access benefits, treatment, health care, shelter, and external housing resources.     [facility]: [address] [place] [address]  [phone]  Monday-Friday 8 am-5 pm  Rule 25 assessment and referral for individuals seeking treatment or counseling for chemical dependency. Must be a resident of [facility]. There is no fee for assessment. There is some funding available for treatment programs.     [facility]: [facility]  [phone] or [phone]  Monday - Friday 7 am - 5 pm  Daily drop-in Rule 25 assessments and weekly mental health assessments and services. Outpatient chemical dependency treatment (with sober recovery housing), relapse prevention, case management, and employment services. Outpatient and residential treatment for women with children. Specializes in assisting individuals with a history of relapse who face multiple barriers to achieving stable recovery. No charge for most services or services can be billed through insurance. Gender-specific services and treatment for co-occurring substance abuse  "and mental health concerns offered.       Crisis Lines  Crisis Text Line  Text 223888  You will be connected with a trained live crisis counselor to provide support.    Por espanol, manio  YURIY a 154881 o texto a 442-AYUDAME en Whatpp    The [place] Project (LGBTQ Youth Crisis Line)  6.614.340.4550  text START to 333-691      [employer] [place] [employer]  Fast Tracker  Linking people to mental health and substance use disorder resources  GeeYee.Folloyu     [place] Mental Health Warm Line  Peer to peer support  Monday thru Saturday, 12 pm to 10 pm  582.357.7822 or 1.249.620.5507  Text \"Support\" to [address]    National [place] on Mental Illness (SUSANNA)  603.289.0849 or 1.888.SUSANNA.HELPS      Mental Health Apps  My3  https://Peeriuspp.org/    VirtualHopeBox  https://[employer]/apps/virtual-hope-box/      Additional Information  Today you were seen by a licensed mental health professional through Triage and Transition services, Behavior[facility] Healthcare Providers ([facility])  for a crisis assessment in the Emergency Department at [facility].  It is recommended that you follow up with your established providers (psychiatrist, mental health therapist, and/or primary care doctor - as relevant) as soon as possible. Coordinators from [facility] will be calling you in the next 24-48 hours to ensure that you have the resources you need.  You can also contact [facility] coordinators directly at [phone]. You may have been scheduled for or offered an appointment with a mental health provider. [facility] maintains an extensive network of licensed behavioral health providers to connect patients with the services they need.  We do not charge providers a fee to participate in our referral network.  We match patients with providers based on a patient's specific needs, insurance coverage, and location.  Our first effort will be to refer you to a provider within your care system, and will utilize providers outside your care system as needed.  "

## 2023-08-22 NOTE — ED PROVIDER NOTES
History     Chief Complaint   Patient presents with    Suicidal     HPI  Michael Waters is a 53 year old male who presents for suicidal statements.  The patient reports that he has been feeling very down and depressed over the past month, he is not sure what is triggering the symptoms but thinks it is a multitude of different things.  Tonight he was having an argument with his significant other, he told her to just kill him to get it done with.  He tells me that he does not have a plan for how to commit suicide but he has been thinking about it more.  His wife convinced him to come in for help.  He has never tried to kill himself in the past.  He does not take any medications and does not go to therapy.  He does admit to drinking alcohol almost every day, reports that he drinks a case of beer over several weeks but oftentimes will drink 1 bottle of wine a day, sometimes 2.  He reports he does have days where he does not drink anything and has not had withdrawal symptoms with this.    I spoke on the phone with the patient's wife. She says that they are arguing and it got heated last evening. The patient did escalate and then asked his wife to kill him. She says he has done this before when they have gotten in arguments in the past. There are no guns in the house.    Allergies:  No Known Allergies    Problem List:    Patient Active Problem List    Diagnosis Date Noted    Stiffness of finger joint, right 06/19/2023     Priority: Medium    Severe episode of recurrent major depressive disorder, without psychotic features (H) 07/02/2018     Priority: Medium    Atopic dermatitis 03/05/2013     Priority: Medium    CARDIOVASCULAR SCREENING; LDL GOAL LESS THAN 160 12/23/2011     Priority: Medium        Past Medical History:    No past medical history on file.    Past Surgical History:    No past surgical history on file.    Family History:    No family history on file.    Social History:  Marital Status:   [2]  Social  "History     Tobacco Use    Smoking status: Never    Smokeless tobacco: Never   Substance Use Topics    Alcohol use: Yes     Comment: occ    Drug use: No        Medications:    albuterol (PROAIR HFA/PROVENTIL HFA/VENTOLIN HFA) 108 (90 Base) MCG/ACT inhaler  benzonatate (TESSALON) 200 MG capsule  clobetasol (TEMOVATE) 0.05 % external ointment  predniSONE (DELTASONE) 20 MG tablet          Review of Systems    Physical Exam   BP: 138/74  Pulse: 98  Temp: 98.2  F (36.8  C)  Resp: 16  Height: 185.4 cm (6' 1\")  Weight: 99.8 kg (220 lb)  SpO2: 97 %      Physical Exam  Constitutional:       General: He is not in acute distress.     Appearance: He is not diaphoretic.   HENT:      Head: Atraumatic.   Eyes:      Pupils: Pupils are equal, round, and reactive to light.   Cardiovascular:      Rate and Rhythm: Regular rhythm.      Heart sounds: Normal heart sounds.   Pulmonary:      Effort: No respiratory distress.      Breath sounds: Normal breath sounds.   Chest:      Chest wall: No tenderness.   Abdominal:      General: Bowel sounds are normal.      Palpations: Abdomen is soft.      Tenderness: There is no abdominal tenderness.   Musculoskeletal:         General: No tenderness. Normal range of motion.      Cervical back: No tenderness.      Thoracic back: No tenderness.      Lumbar back: No tenderness.   Skin:     Findings: No abrasion or laceration.   Neurological:      Mental Status: He is alert and oriented to person, place, and time.      Motor: No abnormal muscle tone.      Gait: Gait normal.   Psychiatric:         Mood and Affect: Affect is tearful.         Thought Content: Thought content includes suicidal ideation. Thought content does not include suicidal plan.         ED Course     Mental Health Risk Assessment        PSS-3      Date and Time Over the past 2 weeks have you felt down, depressed, or hopeless? Over the past 2 weeks have you had thoughts of killing yourself? Have you ever attempted to kill yourself? When " did this last happen? User   08/21/23 2310 yes yes no -- DANNI          C-[facility] ([facility])      Date and Time Q1 Wished to be Dead (Past Month) Q2 Suicidal Thoughts (Past Month) Q3 Suicidal Thought Method Q4 Suicidal Intent without Specific Plan Q5 Suicide Intent with Specific Plan Q6 Suicide Behavior (Lifetime) Within the Past 3 Months? RETIRED: Level of Risk per Screen Screening Not Complete User   08/21/23 2310 yes yes yes yes yes no -- -- -- DANNI              Suicide assessment completed by mental health (D.E.C., LCSW, etc.)       Procedures              Critical Care time:  none               Results for orders placed or performed during the hospital encounter of 08/22/23 (from the past 24 hour(s))   Urine Drugs of Abuse Screen    Narrative    The following orders were created for panel order Urine Drugs of Abuse Screen.  Procedure                               Abnormality         Status                     ---------                               -----------         ------                     Drug abuse screen 77 uri...[[id number]]  Abnormal            Final result                 Please view results for these tests on the individual orders.   Drug abuse screen 77 urine (FL, RH, SH)   Result Value Ref Range    Amphetamines Urine Screen Positive (A) Screen Negative    Barbituates Urine Screen Negative Screen Negative    Benzodiazepine Urine Screen Negative Screen Negative    Cannabinoids Urine Screen Positive (A) Screen Negative    Opiates Urine Screen Negative Screen Negative    PCP Urine Screen Negative Screen Negative    Cocaine Urine Screen Negative Screen Negative       Medications   OLANZapine zydis (zyPREXA) ODT tab 10 mg (has no administration in time range)   acetaminophen (TYLENOL) tablet 650 mg (has no administration in time range)       Assessments & Plan (with Medical Decision Making)   53-year-old male presents for evaluation of depression and suicidal thoughts.  History of depression.  Increasing  thoughts of suicide.  Drinks alcohol daily.  Urine drug screen positive for amphetamines and cannabinoids.  The patient is evaluated by DEC, managed with the patient discussed with them regarding further care and decision regarding hospitalization.  I believe the patient is safe to discharge home and they agree with that assessment.  He denies suicidal ideation now.  I spoke on the phone with the patient's wife who feels comfortable with him coming home.  We have arranged outpatient follow-up with therapy for him.  He is told to return if worse, otherwise follow-up in clinic.  The patient is in agreement with this plan.    I have reviewed the nursing notes.    I have reviewed the findings, diagnosis, plan and need for follow up with the patient.           Medical Decision Making  The patient's presentation was of high complexity (a chronic illness severe exacerbation, progression, or side effect of treatment).    The patient's evaluation involved:  ordering and/or review of 1 test(s) in this encounter (see separate area of note for details)  discussion of management or test interpretation with another health professional (see separate area of note for details)    The patient's management necessitated high risk (a decision regarding hospitalization).        New Prescriptions    No medications on file       Final diagnoses:   Suicidal ideation   Depression with suicidal ideation       8/21/2023   [facility] EMERGENCY DEPT       Sebastien New MD  08/22/23 1469

## 2023-08-22 NOTE — CONSULTS
"Diagnostic Evaluation Consultation  Crisis Assessment    Patient Name: Michael Waters  Age:  53 year old  Legal Sex: male  Gender Identity: male  Pronouns:   Race: White  Ethnicity: Not  or   Language: English      Patient was assessed: Virtual: Telerik      Patient location: [facility] EMERGENCY DEPT ED06    Referral Data and Chief Complaint  Michael Waters presents to the ED by  self. Patient is presenting to the ED for the following concerns: Intoxication, Substance use, Suicidal ideation.   Factors that make the mental health crisis life threatening or complex are:  Patient presented to the hospital by himself after a conflcit with his family in which he voiced suicidal ideation with potential plans. Patient had been drinking alcohol for several hours leading up to the conflict. He shares he engages in daily alcohol use largely to cope with daily stressors and depressive symptoms. After patient became sober he denied all active suicidal ideation, plans, or intent.      Informed Consent and Assessment Methods  Explained the crisis assessment process, including applicable information disclosures and limits to confidentiality, assessed understanding of the process, and obtained consent to proceed with the assessment.  Assessment methods included conducting a formal interview with patient, review of medical records, collaboration with medical staff, and obtaining relevant collateral information from family and community providers when available.     Patient response to interventions: acceptance expressed, verbalizes understanding  Coping skills were attempted to reduce the crisis:        History of the Crisis   Patient has a history of significant alcohol use, sharing he drinks several drinks on average per day. Most of the time his drinking is to the point of inebriation. Patient shares he has been drinking alcohol in this manner for several years. He shares it \"kind of creeped up on me\" " in response to why it started. Patient went on to explain he uses alcohol to cope with his depressive and anxiety symptoms.    Brief Psychosocial History  Family:  , Children yes  Support System:  Wife, Children  Employment Status:  employed full-time  Source of Income:  salary/wages  Financial Environmental Concerns:     Current Hobbies:  television/movies/videos, games, family functions, group/social activities, sports/team sports  Barriers in Personal Life:  emotional concerns, mental health concerns    Significant Clinical History  Current Anxiety Symptoms:  anxious  Current Depression/Trauma:  withdrawl/isolation, thoughts of death/suicide, sadness  Current Somatic Symptoms:  anxious  Current Psychosis/Thought Disturbance:     Current Eating Symptoms:     Chemical Use History:  Alcohol: Daily  Last Use:: 08/21/23  Benzodiazepines: None  Opiates: None  Cocaine: None  Marijuana: None  Other Use: None   Past diagnosis:  Depression, Substance Use Disorder  Family history:  No known history of mental health or chemical health concerns  Past treatment:  Individual therapy, Primary Care  Details of most recent treatment:     Other relevant history:          Collateral Information  Is there collateral information: Yes     Collateral information name, relationship, phone number:  JEAN MILLS (Spouse)   [phone]    What happened today: He got angry over something, put it all on me and said he wanted me to just kill him because he doesn't want to live anymore.     What is different about patient's functioning: He drinks every day and just lets things sit underneath the surface until he blows up.     Concern about alcohol/drug use:      What do you think the patient needs:      Has patient made comments about wanting to kill themselves/others: yes    If d/c is recommended, can they take part in safety/aftercare planning:  yes    Additional collateral information:        Risk Assessment  [place] Suicide  Severity Rating Scale Full Clinical Version:  Suicidal Ideation  Q1 Wish to be Dead (Lifetime): No  Q2 Non-Specific Active Suicidal Thoughts (Lifetime): Yes  Q6 Suicide Behavior (Lifetime): no     Suicidal Behavior (Lifetime)  Actual Attempt (Lifetime): No  Has subject engaged in non-suicidal self-injurious behavior? (Lifetime): No  Interrupted Attempts (Lifetime): No  Aborted or Self-Interrupted Attempt (Lifetime): No  Preparatory Acts or Behavior (Lifetime): No    [place] Suicide Severity Rating Scale Recent:   Suicidal Ideation (Recent)  Q1 Wished to be Dead (Past Month): yes  Q2 Suicidal Thoughts (Past Month): yes  Q3 Suicidal Thought Method: yes  Q4 Suicidal Intent without Specific Plan: yes  Q5 Suicide Intent with Specific Plan: no  Within the Past 3 Months?: no  Level of Risk per Screen: high risk          Environmental or Psychosocial Events: threats to a prized relationship, work or task failure, other life stressors  Protective Factors: Protective Factors: strong bond to family unit, community support, or employment, responsibilities and duties to others, including pets and children, lives in a responsibly safe and stable environment, sense of importance of health and wellness, supportive ongoing medical and mental health care relationships, optimistic outlook - identification of future goals, constructive use of leisure time, enjoyable activities, resilience    Does the patient have thoughts of harming others? Feels Like Hurting Others: no  Previous Attempt to Hurt Others: no  Is the patient engaging in sexually inappropriate behavior?: no    Is the patient engaging in sexually inappropriate behavior?  no        Mental Status Exam   Affect: Appropriate  Appearance: Appropriate  Attention Span/Concentration: Attentive  Eye Contact: Engaged    Fund of Knowledge: Appropriate   Language /Speech Content: Fluent  Language /Speech Volume: Normal  Language /Speech Rate/Productions: Articulate, Normal  Recent  Memory: Intact  Remote Memory: Intact  Mood: Normal  Orientation to Person: Yes   Orientation to Place: Yes  Orientation to Time of Day: Yes  Orientation to Date: Yes     Situation (Do they understand why they are here?): Yes  Psychomotor Behavior: Normal  Thought Content: Clear  Thought Form: Goal Directed, Intact     Mini-Cog Assessment  Number of Words Recalled:    Clock-Drawing Test:     Three Item Recall:    Mini-Cog Total Score:       Medication  Psychotropic medications:   Medication Orders - Psychiatric (From admission, onward)      Start     Dose/Rate Route Frequency Ordered Stop    08/22/23 0800  OLANZapine zydis (zyPREXA) ODT tab 10 mg         10 mg Oral 2 TIMES DAILY 08/22/23 0116               Current Care Team  Patient Care Team:  No Ref-Primary, Physician as PCP - General  Westley Boyle MD as Assigned PCP  Dago Roberts MD as Assigned Musculoskeletal Provider    Diagnosis  Patient Active Problem List   Diagnosis Code    CARDIOVASCULAR SCREENING; LDL GOAL LESS THAN 160 Z13.6    Atopic dermatitis L20.9    Severe episode of recurrent major depressive disorder, without psychotic features (H) F33.2    Stiffness of finger joint, right M25.641       Primary Problem This Admission  Active Hospital Problems    *Severe episode of recurrent major depressive disorder, without psychotic features (H)        Clinical Summary and Substantiation of Recommendations   Patient initially presented to the emergency department due to concerns of suicidal ideation and alcohol intoxication after a conflict with his family. Patient appears to be sober upon interview and able to engage appropriately with this writer. He was able to verbalize that suicidal ideation has since subsided and he would like to be connected to resources if possible. Patient appears to have been struggling with depressive symptoms for a consistent period of time, and is currently in need of mental health therapy, substance use disorder treatment, and  medication management. Patient was agreeable to all resources being given to him and appointments being arranged for mental health therapy and medication management. Patient appears safe to go home at this time. His attending physician in the emergency department agrees with the stated recommendations for care.    Disposition  Recommended disposition: Individual Therapy, Medication Management, Substance Abuse Disorder Treatment        Reviewed case and recommendations with attending provider. Attending Name: Dr. Shaq MD       Attending concurs with disposition: yes       Patient and/or validated legal guardian concurs with disposition:   yes       Final disposition:  discharge    Legal status on admission: Voluntary/Patient has signed consent for treatment    Assessment Details   Total duration spent on the patient case in minutes: 60 min     CPT code(s) utilized: 07042 - Psychotherapy for Crisis - 60 (30-74*) min    BEN Dahl, Psychotherapist  [facility] - Triage & Transition Services  Callback: [phone]

## 2023-08-22 NOTE — ED TRIAGE NOTES
"Came by self tonight with family encouragement. He states he wants to kill himself with a plan he doesn't want to say. \"I've got a few.\" Very flat in triage. Admits to being depressed and sounds depressed when speaking with him.         "

## 2023-10-30 ENCOUNTER — E-VISIT (OUTPATIENT)
Dept: URGENT CARE | Facility: CLINIC | Age: 53
End: 2023-10-30
Payer: COMMERCIAL

## 2023-10-30 DIAGNOSIS — R21 RASH AND NONSPECIFIC SKIN ERUPTION: Primary | ICD-10-CM

## 2023-10-30 PROCEDURE — 99207 PR NON-BILLABLE SERV PER CHARTING: CPT | Performed by: FAMILY MEDICINE

## 2023-10-30 NOTE — PATIENT INSTRUCTIONS
Dear Michael Waters?     After reviewing your responses, I am unable to make a diagnosis that can be treated online.    You will not be charged for this eVisit.     We are dedicated to helping you achieve your best health and would like to see you in one of our many clinic locations - a primary care provider would be ideal for your concern.    Please use [employer] to schedule a visit with a provider or call [phone]) to schedule at any of our locations. Or you may go to any of our Urgent Care Locations    Thanks for choosing?us?as your health care partner,?   ?   Kennedi Almaguer MD?

## 2024-03-11 ENCOUNTER — E-VISIT (OUTPATIENT)
Dept: URGENT CARE | Facility: CLINIC | Age: 54
End: 2024-03-11
Payer: COMMERCIAL

## 2024-03-11 ENCOUNTER — VIRTUAL VISIT (OUTPATIENT)
Dept: FAMILY MEDICINE | Facility: CLINIC | Age: 54
End: 2024-03-11
Payer: COMMERCIAL

## 2024-03-11 DIAGNOSIS — L20.9 ATOPIC DERMATITIS, UNSPECIFIED TYPE: Primary | ICD-10-CM

## 2024-03-11 DIAGNOSIS — L20.9 ATOPIC DERMATITIS, UNSPECIFIED TYPE: ICD-10-CM

## 2024-03-11 PROCEDURE — 99207 PR NON-BILLABLE SERV PER CHARTING: CPT | Performed by: NURSE PRACTITIONER

## 2024-03-11 PROCEDURE — 99213 OFFICE O/P EST LOW 20 MIN: CPT | Mod: 95 | Performed by: STUDENT IN AN ORGANIZED HEALTH CARE EDUCATION/TRAINING PROGRAM

## 2024-03-11 RX ORDER — CLOBETASOL PROPIONATE 0.5 MG/G
OINTMENT TOPICAL
Qty: 60 G | Refills: 0 | Status: SHIPPED | OUTPATIENT
Start: 2024-03-11 | End: 2024-03-11

## 2024-03-11 RX ORDER — PREDNISONE 20 MG/1
TABLET ORAL
Qty: 20 TABLET | Refills: 0 | Status: SHIPPED | OUTPATIENT
Start: 2024-03-11

## 2024-03-11 RX ORDER — CLOBETASOL PROPIONATE 0.5 MG/G
OINTMENT TOPICAL
Qty: 60 G | Refills: 0 | Status: SHIPPED | OUTPATIENT
Start: 2024-03-11 | End: 2024-03-25

## 2024-03-11 ASSESSMENT — PATIENT HEALTH QUESTIONNAIRE - PHQ9
10. IF YOU CHECKED OFF ANY PROBLEMS, HOW DIFFICULT HAVE THESE PROBLEMS MADE IT FOR YOU TO DO YOUR WORK, TAKE CARE OF THINGS AT HOME, OR GET ALONG WITH OTHER PEOPLE: NOT DIFFICULT AT ALL
SUM OF ALL RESPONSES TO PHQ QUESTIONS 1-9: 0
SUM OF ALL RESPONSES TO PHQ QUESTIONS 1-9: 0

## 2024-03-11 NOTE — PATIENT INSTRUCTIONS
Elio Rodriguez,    Thank you for allowing [facility] to manage your care.        I sent your prescriptions to your pharmacy.    For your convenience, test results are released as soon as they are available  Please allow 1-2 business days for me to send you a comment about your results.  If not done so, I encourage you to login into [employer] ([email or web address] to review your results in real time.     If you have any questions or concerns, please feel free to call us at [phone].    Sincerely,    Dr. Denny    Did you know?      You can schedule a video visit for follow-up appointments as well as future appointments for certain conditions.  Please see the below link.     [email or web address]    If you have not already done so,  I encourage you to sign up for [employer] ([email or web address]).  This will allow you to review your results, securely communicate with a provider, and schedule virtual visits as well.

## 2024-03-11 NOTE — PATIENT INSTRUCTIONS
Dear Michael Waters,    We are sorry you are not feeling well. Based on the responses you provided, it is recommended that you be seen in-person in urgent care so we can better evaluate your symptoms. Please click here to find the nearest urgent care location to you.   You will not be charged for this Visit. Thank you for trusting us with your care.    Karen Vogt, CNP      Sorry, we can't not prescribe steroids through an evisit.You would need to come in for evaluation.

## 2024-03-11 NOTE — PROGRESS NOTES
"Michael is a 53 year old who is being evaluated via a billable video visit.      How would you like to obtain your AVS? [employer]  If the video visit is dropped, the invitation should be resent by: Text to cell phone: [phone]  Will anyone else be joining your video visit? No          Assessment & Plan     Atopic dermatitis, unspecified type  Unstable. Path result from 11/12/13 reviewed \"the histologic findings are of a chronic eczematous process including  lichen simplex chronicus \"  - predniSONE (DELTASONE) 20 MG tablet; 2 tabs daily x 3 days, then 1 tab daily x 3 days, then 1/2 tab daily x 3 days then 1/4 for 3 days take with food  - clobetasol (TEMOVATE) 0.05 % external ointment; Apply sparingly to affected area twice daily for 30 days.  Do not apply to face.    The risks, benefits and treatment options of prescribed medications or other treatments have been discussed with the patient. The patient verbalized their understanding and should call or follow up if no improvement or if they develop further problems        BMI  Estimated body mass index is 29.03 kg/m  as calculated from the following:    Height as of 8/21/23: 1.854 m (6' 1\").    Weight as of 8/21/23: 99.8 kg (220 lb).   Weight management plan: Discussed healthy diet and exercise guidelines          Subjective   Michael is a 53 year old, presenting for the following health issues:  Derm Problem      3/11/2024    10:42 AM   Additional Questions   Roomed by Patient completed echeck in via [employer]     History of Present Illness       Reason for visit:  Skin rash    He eats 2-3 servings of fruits and vegetables daily.He consumes 2 sweetened beverage(s) daily.He exercises with enough effort to increase his heart rate 30 to 60 minutes per day.  He exercises with enough effort to increase his heart rate 5 days per week.   He is taking medications regularly.       Patient has history of rash that flares up once a year. It has been ongoing intermittently for 13 " years.      Review of Systems  Constitutional, neuro, ENT, endocrine, pulmonary, cardiac, gastrointestinal, genitourinary, musculoskeletal, integument and psychiatric systems are negative, except as otherwise noted.      Objective           Vitals:  No vitals were obtained today due to virtual visit.    Physical Exam   GENERAL: alert and no distress  EYES: Eyes grossly normal to inspection.  No discharge or erythema, or obvious scleral/conjunctival abnormalities.  RESP: No audible wheeze, cough, or visible cyanosis.    SKIN: Scattered erythematous rash in the lower extremity  PSYCH: Appropriate affect, tone, and pace of words          Video-Visit Details    Type of service:  Video Visit     Originating Location (pt. Location): Home    Distant Location (provider location):  On-site  Platform used for Video Visit: Kandi  Signed Electronically by: Day Denny MD

## 2024-03-12 RX ORDER — CLOBETASOL PROPIONATE 0.5 MG/G
OINTMENT TOPICAL
Qty: 60 G | Refills: 0 | OUTPATIENT
Start: 2024-03-12

## 2024-03-25 DIAGNOSIS — L20.9 ATOPIC DERMATITIS, UNSPECIFIED TYPE: ICD-10-CM

## 2024-03-25 RX ORDER — CLOBETASOL PROPIONATE 0.5 MG/G
OINTMENT TOPICAL
Qty: 60 G | Refills: 0 | Status: SHIPPED | OUTPATIENT
Start: 2024-03-25 | End: 2024-03-25

## 2024-03-25 RX ORDER — CLOBETASOL PROPIONATE 0.5 MG/G
OINTMENT TOPICAL
Qty: 60 G | Refills: 0 | Status: SHIPPED | OUTPATIENT
Start: 2024-03-25

## 2024-07-06 ENCOUNTER — HEALTH MAINTENANCE LETTER (OUTPATIENT)
Age: 54
End: 2024-07-06

## 2024-08-26 ENCOUNTER — OFFICE VISIT (OUTPATIENT)
Dept: ORTHOPEDICS | Facility: CLINIC | Age: 54
End: 2024-08-26
Payer: COMMERCIAL

## 2024-08-26 ENCOUNTER — ANCILLARY PROCEDURE (OUTPATIENT)
Dept: GENERAL RADIOLOGY | Facility: CLINIC | Age: 54
End: 2024-08-26
Attending: FAMILY MEDICINE
Payer: COMMERCIAL

## 2024-08-26 ENCOUNTER — PRE VISIT (OUTPATIENT)
Dept: ORTHOPEDICS | Facility: CLINIC | Age: 54
End: 2024-08-26

## 2024-08-26 DIAGNOSIS — M25.362 KNEE INSTABILITY, LEFT: Primary | ICD-10-CM

## 2024-08-26 DIAGNOSIS — M25.569 KNEE PAIN: Primary | ICD-10-CM

## 2024-08-26 DIAGNOSIS — M25.569 KNEE PAIN: ICD-10-CM

## 2024-08-26 DIAGNOSIS — S83.512A RUPTURE OF ANTERIOR CRUCIATE LIGAMENT OF LEFT KNEE, INITIAL ENCOUNTER: ICD-10-CM

## 2024-08-26 PROCEDURE — 99203 OFFICE O/P NEW LOW 30 MIN: CPT | Performed by: FAMILY MEDICINE

## 2024-08-26 PROCEDURE — 73562 X-RAY EXAM OF KNEE 3: CPT | Mod: LT | Performed by: RADIOLOGY

## 2024-08-26 NOTE — TELEPHONE ENCOUNTER
DIAGNOSIS: I slipped and fell removing a dock. My left knee is really bothering me and doesn t seem like it s getting any better.     APPOINTMENT DATE: 8.26.24   NOTES STATUS DETAILS   MEDICATION LIST Internal    XRAYS (IMAGES & REPORTS) Internal 11.14.15  XR Knee Wilfrido

## 2024-08-26 NOTE — PROGRESS NOTES
Left knee injury after slipping, while removing a dock    Today, the patient reports that on 8/10/24 he was removing a dock, he was walking on one of the metal panels and slipped, he fell off and hit his left lateral thigh on one of the rods, but believes he twisted the knee as well. Pain is located in two areas, lateral thigh, but also lateral knee and anterior knee. He did have a lot of ecchymosis over the posterior aspect of the knee. Pain is worse with weightbearing activities, twisting/pivoting, at night trying to sleep. He will get occasional instability, denies any locking or catching. He is taking ibuprofen for his pain prn.     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  He indicates it is not stressful for his knee.  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  He is not involved in any regular athletic activity apart from these demands.          XR TIBIA FIBULA AP LATERAL LEFT 2/7/2015 [facility]  Clinical History: Trauma     Technique: XR TIBIA FIBULA AP LATERAL LEFT     Comparison: None.     Findings: No fracture. No osseous lesion is identified. No soft   tissue abnormality is evident.     Impression: Unremarkable left tibia and fibula examination.           PMH:  Depression     Active problem list:  Patient Active Problem List   Diagnosis    CARDIOVASCULAR SCREENING; LDL GOAL LESS THAN 160    Atopic dermatitis    Severe episode of recurrent major depressive disorder, without psychotic features (H)    Stiffness of finger joint, right       FH:  No family history on file.    SH:  Social History     Socioeconomic History    Marital status:      Spouse name: Not on file    Number of children: Not on file    Years of education: Not on file    Highest education level: Not on file   Occupational History    Not on file   Tobacco Use    Smoking status: Never    Smokeless tobacco: Never   Substance and Sexual Activity    Alcohol use:  Yes     Comment: occ    Drug use: No    Sexual activity: Yes     Partners: Female   Other Topics Concern    Parent/sibling w/ CABG, MI or angioplasty before 65F 55M? 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    Re-    3 kids     Social Determinants of Health     Financial Resource Strain: Not on file   Food Insecurity: Not on file   Transportation Needs: Not on file   Physical Activity: Not on file   Stress: Not on file   Social Connections: Not on file   Interpersonal Safety: Not on file   Housing Stability: Not on file       MEDS:  See EMR, reviewed  ALL:  See EMR, reviewed    REVIEW OF SYSTEMS:  CONSTITUTIONAL:NEGATIVE for fever, chills, change in weight  INTEGUMENTARY/SKIN: NEGATIVE for worrisome rashes, moles or lesions  EYES: NEGATIVE for vision changes or irritation  ENT/MOUTH: NEGATIVE for ear, mouth and throat problems  RESP:NEGATIVE for significant cough or SOB  BREAST: NEGATIVE for masses, tenderness or discharge  CV: NEGATIVE for chest pain, palpitations or peripheral edema  GI: NEGATIVE for nausea, abdominal pain, heartburn, or change in bowel habits  :NEGATIVE for frequency, dysuria, or hematuria  :NEGATIVE for frequency, dysuria, or hematuria  NEURO: NEGATIVE for weakness, dizziness or paresthesias  ENDOCRINE: NEGATIVE for temperature intolerance, skin/hair changes  HEME/ALLERGY/IMMUNE: NEGATIVE for bleeding problems  PSYCHIATRIC: NEGATIVE for changes in mood or affect      Objective: The left knee reveals no effusion today and I can see no bruising posterior to the knee.  He can fire his distal hamstring against resistance with normal strength.  Active straight leg raise in the supine position is negative.  Patellar apprehension signs are negative.  Nontender over the medial or lateral joint line.  I can flex and extend the left knee fully.  Nontender at the posterior lateral corner.  MCL and LCL stresses are without laxity.  Lachman's  is positive  compared to the nonaffected knee.  Posterior drawer is negative.  No swelling in the popliteal space or tenderness in the calf.  Appropriate in conversation and affect.    I personally viewed with the patient an x-ray of the left knee that shows no significant degenerative change and no acute fracture.    Assessment: Left-sided knee instability status post injury 3 weeks ago, suspect ACL rupture.    Plan: MRI of the knee is pending.  Face-to-face follow-up after the MRI to go over options.  Patient indicates he has a knee brace available if needed.  He had no further questions and will follow-up after the MRI.

## 2024-08-26 NOTE — LETTER
8/26/2024      RE: Michael Waters  [id number]  [facility] [address]     Dear Colleague,    Thank you for referring your patient, Michael Waters, to the [facility] [place]. Please see a copy of my visit note below.    Left knee injury after slipping, while removing a dock    Today, the patient reports that on 8/10/24 he was removing a dock, he was walking on one of the metal panels and slipped, he fell off and hit his left lateral thigh on one of the rods, but believes he twisted the knee as well. Pain is located in two areas, lateral thigh, but also lateral knee and anterior knee. He did have a lot of ecchymosis over the posterior aspect of the knee. Pain is worse with weightbearing activities, twisting/pivoting, at night trying to sleep. He will get occasional instability, denies any locking or catching. He is taking ibuprofen for his pain prn.     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  He indicates it is not stressful for his knee.  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  He is not involved in any regular athletic activity apart from these demands.          XR TIBIA FIBULA AP LATERAL LEFT 2/7/2015 [facility]  Clinical History: Trauma     Technique: XR TIBIA FIBULA AP LATERAL LEFT     Comparison: None.     Findings: No fracture. No osseous lesion is identified. No soft   tissue abnormality is evident.     Impression: Unremarkable left tibia and fibula examination.           PMH:  Depression     Active problem list:  Patient Active Problem List   Diagnosis     CARDIOVASCULAR SCREENING; LDL GOAL LESS THAN 160     Atopic dermatitis     Severe episode of recurrent major depressive disorder, without psychotic features (H)     Stiffness of finger joint, right       FH:  No family history on file.    SH:  Social History     Socioeconomic History     Marital status:      Spouse name: Not on file      Number of children: Not on file     Years of education: Not on file     Highest education level: Not on file   Occupational History     Not on file   Tobacco Use     Smoking status: Never     Smokeless tobacco: Never   Substance and Sexual Activity     Alcohol use: Yes     Comment: occ     Drug use: No     Sexual activity: Yes     Partners: Female   Other Topics Concern     Parent/sibling w/ CABG, MI or angioplasty before 65F 55M? 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    Re-    3 kids     Social Determinants of Health     Financial Resource Strain: Not on file   Food Insecurity: Not on file   Transportation Needs: Not on file   Physical Activity: Not on file   Stress: Not on file   Social Connections: Not on file   Interpersonal Safety: Not on file   Housing Stability: Not on file       MEDS:  See EMR, reviewed  ALL:  See EMR, reviewed    REVIEW OF SYSTEMS:  CONSTITUTIONAL:NEGATIVE for fever, chills, change in weight  INTEGUMENTARY/SKIN: NEGATIVE for worrisome rashes, moles or lesions  EYES: NEGATIVE for vision changes or irritation  ENT/MOUTH: NEGATIVE for ear, mouth and throat problems  RESP:NEGATIVE for significant cough or SOB  BREAST: NEGATIVE for masses, tenderness or discharge  CV: NEGATIVE for chest pain, palpitations or peripheral edema  GI: NEGATIVE for nausea, abdominal pain, heartburn, or change in bowel habits  :NEGATIVE for frequency, dysuria, or hematuria  :NEGATIVE for frequency, dysuria, or hematuria  NEURO: NEGATIVE for weakness, dizziness or paresthesias  ENDOCRINE: NEGATIVE for temperature intolerance, skin/hair changes  HEME/ALLERGY/IMMUNE: NEGATIVE for bleeding problems  PSYCHIATRIC: NEGATIVE for changes in mood or affect      Objective: The left knee reveals no effusion today and I can see no bruising posterior to the knee.  He can fire his distal hamstring against resistance with normal strength.  Active straight leg raise in  the supine position is negative.  Patellar apprehension signs are negative.  Nontender over the medial or lateral joint line.  I can flex and extend the left knee fully.  Nontender at the posterior lateral corner.  MCL and LCL stresses are without laxity.  Lachman's is positive  compared to the nonaffected knee.  Posterior drawer is negative.  No swelling in the popliteal space or tenderness in the calf.  Appropriate in conversation and affect.    I personally viewed with the patient an x-ray of the left knee that shows no significant degenerative change and no acute fracture.    Assessment: Left-sided knee instability status post injury 3 weeks ago, suspect ACL rupture.    Plan: MRI of the knee is pending.  Face-to-face follow-up after the MRI to go over options.  Patient indicates he has a knee brace available if needed.  He had no further questions and will follow-up after the MRI.                    Again, thank you for allowing me to participate in the care of your patient.      Sincerely,    Jc Hurt MD

## 2024-09-11 ENCOUNTER — TELEPHONE (OUTPATIENT)
Dept: ORTHOPEDICS | Facility: CLINIC | Age: 54
End: 2024-09-11

## 2024-09-11 NOTE — TELEPHONE ENCOUNTER
I attempted to call the patient regarding their upcoming appointment with Dr. Hurt today to review knee MRI results. He cancelled his knee MRI and I was wondering if he needed help rescheduling the MRI or if he had further questions. No answer, left message to call back and discuss. Lcui Medina ATC on 9/11/2024 at 9:32 AM

## 2025-04-01 ENCOUNTER — HOSPITAL ENCOUNTER (EMERGENCY)
Facility: CLINIC | Age: 55
Discharge: HOME | End: 2025-04-01
Attending: NURSE PRACTITIONER | Admitting: NURSE PRACTITIONER
Payer: COMMERCIAL

## 2025-04-01 VITALS
OXYGEN SATURATION: 100 % | HEART RATE: 67 BPM | RESPIRATION RATE: 16 BRPM | SYSTOLIC BLOOD PRESSURE: 126 MMHG | DIASTOLIC BLOOD PRESSURE: 88 MMHG

## 2025-04-01 DIAGNOSIS — L30.9 ECZEMA, UNSPECIFIED TYPE: ICD-10-CM

## 2025-04-01 DIAGNOSIS — L20.9 ATOPIC DERMATITIS, UNSPECIFIED TYPE: Primary | ICD-10-CM

## 2025-04-01 PROCEDURE — 99213 OFFICE O/P EST LOW 20 MIN: CPT | Performed by: NURSE PRACTITIONER

## 2025-04-01 PROCEDURE — G0463 HOSPITAL OUTPT CLINIC VISIT: HCPCS | Performed by: NURSE PRACTITIONER

## 2025-04-01 RX ORDER — TRIAMCINOLONE ACETONIDE 5 MG/G
1 OINTMENT TOPICAL 2 TIMES DAILY
Qty: 60 G | Refills: 0 | Status: SHIPPED | OUTPATIENT
Start: 2025-04-01 | End: 2025-05-01

## 2025-04-01 RX ORDER — PREDNISONE 20 MG/1
TABLET ORAL
Qty: 5 TABLET | Refills: 0 | Status: SHIPPED | OUTPATIENT
Start: 2025-04-01 | End: 2025-04-08

## 2025-04-01 RX ORDER — PREDNISONE 20 MG/1
TABLET ORAL
Qty: 20 TABLET | Refills: 0 | Status: SHIPPED | OUTPATIENT
Start: 2025-04-01

## 2025-04-01 RX ORDER — CETIRIZINE HYDROCHLORIDE 10 MG/1
10 TABLET ORAL 2 TIMES DAILY
Qty: 14 TABLET | Refills: 0 | Status: SHIPPED | OUTPATIENT
Start: 2025-04-01 | End: 2025-04-08

## 2025-04-01 ASSESSMENT — COLUMBIA-SUICIDE SEVERITY RATING SCALE - C-SSRS
1. IN THE PAST MONTH, HAVE YOU WISHED YOU WERE DEAD OR WISHED YOU COULD GO TO SLEEP AND NOT WAKE UP?: NO
2. HAVE YOU ACTUALLY HAD ANY THOUGHTS OF KILLING YOURSELF IN THE PAST MONTH?: NO
6. HAVE YOU EVER DONE ANYTHING, STARTED TO DO ANYTHING, OR PREPARED TO DO ANYTHING TO END YOUR LIFE?: NO

## 2025-04-01 ASSESSMENT — ACTIVITIES OF DAILY LIVING (ADL): ADLS_ACUITY_SCORE: 41

## 2025-04-01 NOTE — DISCHARGE INSTRUCTIONS
Recommend trying the Zyrtec twice daily for the next 7 days, triamcinolone cream is ordered for you to apply in your hands and feet twice daily for the next 2 weeks or until seen by dermatology, prednisone ordered for you to decrease some of the inflammation. I have ordered a dermatology consult for you to be seen within 3 to 5 days.  Recommend calling central scheduling phone number for [facility] at [phone].  Advise you to contact central scheduling tomorrow morning first thing to get this scheduled.  Advised that this is not for just a general dermatology consult that this is for severe skin reaction symptoms.  Some side effects of prednisone include increased irritability, potential worsening of depression symptoms if symptoms of this began recommend stopping the prednisone.

## 2025-04-01 NOTE — ED PROVIDER NOTES
Chief Complaint:   No chief complaint on file.       HPI:   Michael Waters is a 54 year old male who presents with a rash to the hand and and feet bilateral.  First noticed 7 day(s) ago and has not spread.  It does itch.  Fever is absent.  Associated symptoms:  blisters, itching, pain, and redness.  Symptoms have been worsening.  Therapies tried include:  OTC Topical Antifungals, OTC Topical Anti-itch, OTC Topical Moisturizers, and OTC Topical Steroids without improvement.  Recent exposure history includes:  none known.  Immunizations: up to date and documented      Medications:   Current Outpatient Medications   Medication Sig Dispense Refill    cetirizine (ZYRTEC) 10 MG tablet Take 1 tablet (10 mg) by mouth 2 times daily for 7 days. 14 tablet 0    triamcinolone (KENALOG) 0.5 % external ointment Apply 1 g topically 2 times daily. 60 g 0    clobetasol (TEMOVATE) 0.05 % external ointment Apply sparingly to lower extremity(affected area) twice daily for 10 days and as needed daily thereafter .  Do not apply to face. 60 g 0    predniSONE (DELTASONE) 20 MG tablet 2 tabs daily x 3 days, then 1 tab daily x 3 days, then 1/2 tab daily x 3 days then 1/4 for 3 days. 20 tablet 0       Allergies:   No Known Allergies    Medications updated and reviewed.  Past, family and surgical history is updated and reviewed in the record.  Patient Active Problem List    Diagnosis Date Noted    Stiffness of finger joint, right 06/19/2023     Priority: Medium    Severe episode of recurrent major depressive disorder, without psychotic features (H) 07/02/2018     Priority: Medium    Atopic dermatitis 03/05/2013     Priority: Medium    CARDIOVASCULAR SCREENING; LDL GOAL LESS THAN 160 12/23/2011     Priority: Medium     No past medical history on file.      Review of Systems:  General: Per HPI  Throat: no pain, no oral sores  Skin: Per HPI    Physical Exam:   /88   Pulse 67   Resp 16   SpO2 100%    General:healthy, alert and no distress,  appears hydarated, vital signs stable   Eyes: conjunctivae not injected /corneas clear. PERRL, EOM's intact.  Nose: Nares normal  Mouth/Throat: NORMAL - no erythema, no adenopathy, no exudates.  Chest/Pulmonary: normal and nonlabored breathing  Cardiovascular: Extremities well-perfused brisk capillary refill.  No edema in lower extremities.  Skin: has rash on hands and feet primarily palmar and soles of the feet.  Appearance: Eczema: Small fluid-filled blisters, dry, slightly raised, red patches with mild flaking.    Assessment:  atopic dermatitis/eczema    Plan:   Cetirizine 10 mg twice daily  Prednisone burst ordered to decrease inflammation and triamcinolone ointment twice daily    First dose was not given in the ED.   Referral to Dermatology for patient to be seen within 3 to 5 days.  Patient was given the central scheduling phone number for scheduling dermatology consult.  Zyrtec 10mg twice daily suggested for itchy rash.  Follow up with PCP if not improving in 2 days and return to the ER with trouble breathing, throat/mouth/lip swelling or any other concerns.       Condition on disposition: Stable  Disclaimer: This note consists of symbols derived from keyboarding, dictation, and/or voice recognition software. As a result, there may be errors in the script that have gone undetected.  Please consider this when interpreting information found in the chart.       Sissy Hairston, SHARIF CNP  04/01/25 [id number]

## 2025-04-02 ENCOUNTER — TELEPHONE (OUTPATIENT)
Dept: DERMATOLOGY | Facility: CLINIC | Age: 55
End: 2025-04-02
Payer: COMMERCIAL

## 2025-04-02 NOTE — TELEPHONE ENCOUNTER
4/2/2025 11:42 AM  Reason For Call: appointment  Call Result: Left message    Appt Notes: Rash    Additional Notes: See RN Note    Overbook? Yes - Overbook    Provider: Any [place] Derm Provider    Call Attempt: 1

## 2025-04-02 NOTE — TELEPHONE ENCOUNTER
"Reviewed ED note from 4/1/25.     \"HPI:   Michael Waters is a 54 year old male who presents with a rash to the hand and and feet bilateral.  First noticed 7 day(s) ago and has not spread.  It does itch.  Fever is absent.  Associated symptoms:  blisters, itching, pain, and redness.  Symptoms have been worsening.  Therapies tried include:  OTC Topical Antifungals, OTC Topical Anti-itch, OTC Topical Moisturizers, and OTC Topical Steroids without improvement.  Recent exposure history includes:  none known...  Skin: has rash on hands and feet primarily palmar and soles of the feet.  Appearance: Eczema: Small fluid-filled blisters, dry, slightly raised, red patches with mild flaking...   Plan:   Cetirizine 10 mg twice daily  Prednisone burst ordered to decrease inflammation and triamcinolone ointment twice daily\"    Not an emergency referral. But ok to offer a sooner appointment either with Dr. Obregon as a double book on his schedule or with Sharron Pham.    Nancy Lorenzo RN on 4/2/2025 at 11:31 AM      "

## 2025-04-02 NOTE — TELEPHONE ENCOUNTER
This encounter is being sent to inform the clinic that this patient has a referral from  Sissy Hairston APRN CNP, for the diagnoses of Atopic Dermatitis, Eczema, unspecified type [L30.9], Atopic dermatitis, unspecified type [L20.9, and has requested that this patient be seen within 3-5 days.   Based on the availability of our provider(s), we are unable to accommodate this request.    Were all sites offered this patient?  Yes    Does scheduling algorithm request to schedule next available?  Patient has been scheduled for the first available opening with Dr. Obregon on 11/5/25.  We have informed the patient that the clinic will review their referral and reach out if a sooner appointment is medically necessary.

## 2025-04-20 ENCOUNTER — HOSPITAL ENCOUNTER (EMERGENCY)
Facility: CLINIC | Age: 55
Discharge: HOME | End: 2025-04-20
Payer: COMMERCIAL

## 2025-04-20 VITALS
TEMPERATURE: 100 F | HEART RATE: 90 BPM | DIASTOLIC BLOOD PRESSURE: 94 MMHG | RESPIRATION RATE: 20 BRPM | OXYGEN SATURATION: 99 % | SYSTOLIC BLOOD PRESSURE: 146 MMHG

## 2025-04-20 DIAGNOSIS — J02.9 PHARYNGITIS, UNSPECIFIED ETIOLOGY: ICD-10-CM

## 2025-04-20 DIAGNOSIS — H66.001 RIGHT ACUTE SUPPURATIVE OTITIS MEDIA: ICD-10-CM

## 2025-04-20 LAB — S PYO DNA THROAT QL NAA+PROBE: NOT DETECTED

## 2025-04-20 PROCEDURE — 87651 STREP A DNA AMP PROBE: CPT

## 2025-04-20 PROCEDURE — 99213 OFFICE O/P EST LOW 20 MIN: CPT

## 2025-04-20 PROCEDURE — G0463 HOSPITAL OUTPT CLINIC VISIT: HCPCS

## 2025-04-20 RX ORDER — DEXTROAMPHETAMINE SACCHARATE, AMPHETAMINE ASPARTATE, DEXTROAMPHETAMINE SULFATE AND AMPHETAMINE SULFATE 2.5; 2.5; 2.5; 2.5 MG/1; MG/1; MG/1; MG/1
TABLET ORAL
COMMUNITY
Start: 2025-03-22

## 2025-04-20 RX ORDER — BUPROPION HYDROCHLORIDE 300 MG/1
1 TABLET ORAL
COMMUNITY
Start: 2025-04-04

## 2025-04-20 RX ORDER — AMOXICILLIN 875 MG/1
875 TABLET, COATED ORAL 2 TIMES DAILY
Qty: 14 TABLET | Refills: 0 | Status: SHIPPED | OUTPATIENT
Start: 2025-04-20 | End: 2025-04-27

## 2025-04-20 ASSESSMENT — COLUMBIA-SUICIDE SEVERITY RATING SCALE - C-SSRS
2. HAVE YOU ACTUALLY HAD ANY THOUGHTS OF KILLING YOURSELF IN THE PAST MONTH?: NO
6. HAVE YOU EVER DONE ANYTHING, STARTED TO DO ANYTHING, OR PREPARED TO DO ANYTHING TO END YOUR LIFE?: NO
1. IN THE PAST MONTH, HAVE YOU WISHED YOU WERE DEAD OR WISHED YOU COULD GO TO SLEEP AND NOT WAKE UP?: NO

## 2025-04-20 NOTE — DISCHARGE INSTRUCTIONS
You have an ear infection.  We also tested you for strep.  You will receive strep test on your MyChart.  Take antibiotics as directed for the ear infection.  This will also cover for strep throat.  Follow-up with primary provider, you will receive a call later this week to establish with someone.  Return to ER if you develop severe pain, persistent fevers or anything else concerning to you.

## 2025-04-20 NOTE — ED TRIAGE NOTES
"Pt reports body aches, fever, cough , \" bad taste in mouth \" symptom onset 4/18/25  Pt states took ibuprofen around 1300        "

## 2025-04-20 NOTE — ED PROVIDER NOTES
Chief Complaint:   Chief Complaint   Patient presents with    Generalized Body Aches         HPI:   Michael Waters is a 54 year old male who presents to  for evaluation of fever, cough, pharyngitis, bad taste in mouth. Symptoms initially began 2 days ago with a Tmax of 100.6  F.  So far has taken ibuprofen earlier today without sustained relief in symptoms. He denies chest congestion, chest pain, copious nasal discharge, decreased appetite, decreased urine output, diarrhea, dizziness, headache, nausea, shortness of breath, vomiting, and wheezing.  Declined viral testing today.    Problem List:    Patient Active Problem List    Diagnosis Date Noted    Stiffness of finger joint, right 06/19/2023     Priority: Medium    Severe episode of recurrent major depressive disorder, without psychotic features (H) 07/02/2018     Priority: Medium    Atopic dermatitis 03/05/2013     Priority: Medium    CARDIOVASCULAR SCREENING; LDL GOAL LESS THAN 160 12/23/2011     Priority: Medium        Past Medical History:    History reviewed. No pertinent past medical history.    Past Surgical History:    History reviewed. No pertinent surgical history.    Meds:   Current Outpatient Medications   Medication Sig Dispense Refill    amoxicillin (AMOXIL) 875 MG tablet Take 1 tablet (875 mg) by mouth 2 times daily for 7 days. 14 tablet 0    amphetamine-dextroamphetamine (ADDERALL) 10 MG tablet TAKE 2 TABLETS BY MOUTH ONCE DAILY AS NEEDED      buPROPion (WELLBUTRIN XL) 300 MG 24 hr tablet Take 1 tablet by mouth daily at 2 pm.      clobetasol (TEMOVATE) 0.05 % external ointment Apply sparingly to lower extremity(affected area) twice daily for 10 days and as needed daily thereafter .  Do not apply to face. 60 g 0    predniSONE (DELTASONE) 20 MG tablet 2 tabs daily x 3 days, then 1 tab daily x 3 days, then 1/2 tab daily x 3 days then 1/4 for 3 days. 20 tablet 0    triamcinolone (KENALOG) 0.5 % external ointment Apply 1 g topically 2 times daily. 60 g  0       Allergies:   No Known Allergies    Medications updated and reviewed.  Past, family and surgical history is updated and reviewed in the record.     Review of Systems:  Pertinent review of systems as documented per HPI above.    Physical Exam:   BP (!) 146/94   Pulse 90   Temp 100  F (37.8  C) (Tympanic)   Resp 20   SpO2 99%    General: alert and no acute distress  Eyes: negative, conjunctivae not injected /corneas clear. PERRL, EOM's intact.  Ears: Bilateral TMs erythematous, right with suppurative effusion and bulging.  Canals without swelling or drainage.  External ears normal.  Nose: Nares normal and no rhinorrhea  Mouth/Throat: moist mucus membranes, moderate oropharyngeal erythema without exudate.  No PTA.  Voice is not muffled.  Tolerate secretions.  Neck: Neck supple, no adenopathy  Chest/Pulmonary: CTAB without wheezes, rales, or rhonchi. No signs of respiratory distress.  Cardiovascular: RRR normal S1S2  Abdomen: Bowel sounds normoactive, abdomen soft without tenderness, guarding or rigidity. No HSM.   Skin:  Skin color, texture, turgor normal. No rashes or lesions.    Results for orders placed or performed during the hospital encounter of 04/20/25   Group A Streptococcus PCR Throat Swab     Status: Normal    Specimen: Throat; Swab   Result Value Ref Range    Group A strep by PCR Not Detected Not Detected    Narrative    The Xpert Xpress Strep A test, performed on the [employer], is a rapid, qualitative in vitro diagnostic test for the detection of Streptococcus pyogenes (Group A ß-hemolytic Streptococcus, Strep A) in throat swab specimens from patients with signs and symptoms of pharyngitis. The Xpert Xpress Strep A test can be used as an aid in the diagnosis of Group A Streptococcal pharyngitis. The assay is not intended to monitor treatment for Group A Streptococcus infections. The Xpert Xpress Strep A test utilizes an automated real-time polymerase chain reaction (PCR) to  detect Streptococcus pyogenes DNA.       Assessment:  Right acute suppurative otitis media  Pharyngitis, unspecified etiology    Plan:   54-year-old male who presents for evaluation of pharyngitis, fever, cough, and a bad taste in his mouth that began 2 days ago.  He declined viral testing today.    Patient well-appearing on arrival, initial vital signs notable for hypertension and tachycardia of 122 bpm, on my evaluation decreased to 90 bpm.  Examination notable for erythema, bulging and suppurative effusion to right TM, moderate oropharyngeal erythema.  Strep test negative..  He does have signs concerning for ear infection.  Will treat with amoxicillin, discussed potential adverse effects.  Additionally recommended supportive cares that may aid with symptoms.  Primary care referral was placed for him to establish care as he reports he has not seen a primary provider in several years.    Strict UC/ED return precautions discussed in detail including prolonged fevers, development of shortness of breath or trouble with breathing, weakness or lightheadedness, inability to tolerate oral intake or anything else that is concerning to them. All questions were answered. Pt verbalized understanding and agreement with the above plan.     Condition on disposition: Stable    Disclaimer: This note consists of symbols derived from keyboarding, dictation, and/or voice recognition software. As a result, there may be errors in the script that have gone undetected.  Please consider this when interpreting information found in the chart.         Joann Oquendo PA-C  04/20/25 0255

## 2025-04-23 ENCOUNTER — HOSPITAL ENCOUNTER (EMERGENCY)
Facility: CLINIC | Age: 55
Discharge: HOME | End: 2025-04-23
Payer: COMMERCIAL

## 2025-04-23 VITALS
HEART RATE: 104 BPM | OXYGEN SATURATION: 98 % | DIASTOLIC BLOOD PRESSURE: 77 MMHG | TEMPERATURE: 98.5 F | SYSTOLIC BLOOD PRESSURE: 113 MMHG | RESPIRATION RATE: 20 BRPM

## 2025-04-23 DIAGNOSIS — B34.9 VIRAL ILLNESS: ICD-10-CM

## 2025-04-23 PROCEDURE — G0463 HOSPITAL OUTPT CLINIC VISIT: HCPCS

## 2025-04-23 PROCEDURE — 99213 OFFICE O/P EST LOW 20 MIN: CPT

## 2025-04-23 RX ORDER — PREDNISONE 20 MG/1
TABLET ORAL
Qty: 10 TABLET | Refills: 0 | Status: SHIPPED | OUTPATIENT
Start: 2025-04-23

## 2025-04-23 RX ORDER — BENZONATATE 200 MG/1
200 CAPSULE ORAL 3 TIMES DAILY PRN
Qty: 20 CAPSULE | Refills: 0 | Status: SHIPPED | OUTPATIENT
Start: 2025-04-23 | End: 2025-04-30

## 2025-04-23 ASSESSMENT — ACTIVITIES OF DAILY LIVING (ADL): ADLS_ACUITY_SCORE: 41

## 2025-04-23 NOTE — ED TRIAGE NOTES
Pt reports recent UC visit 4/20 and not feeling any better  Taking amoxicillin as prescribed

## 2025-04-23 NOTE — ED PROVIDER NOTES
History     Chief Complaint   Patient presents with    Cough     HPI  Michael Waters is a 54 year old male who presents urgent care with chief complaint of cough and nasal congestion.  Patient reports acute onset of viral symptoms developed 5 days ago.  He was seen on 4/20/2025 and tested negative for strep throat but was diagnosed with a right ear infection.  He was prescribed amoxicillin for this.  Patient reports improvement of ear pain.  He continues to have nasal congestion, mild cough and fatigue.  Has been taking Advil without relief.  Denies nausea, vomiting, abdominal pain, chest pain, wheezing, shortness of breath.    Allergies:  No Known Allergies    Problem List:    Patient Active Problem List    Diagnosis Date Noted    Stiffness of finger joint, right 06/19/2023     Priority: Medium    Severe episode of recurrent major depressive disorder, without psychotic features (H) 07/02/2018     Priority: Medium    Atopic dermatitis 03/05/2013     Priority: Medium    CARDIOVASCULAR SCREENING; LDL GOAL LESS THAN 160 12/23/2011     Priority: Medium        Past Medical History:    No past medical history on file.    Past Surgical History:    No past surgical history on file.    Family History:    No family history on file.    Social History:  Marital Status:   [2]  Social History     Tobacco Use    Smoking status: Never    Smokeless tobacco: Never   Substance Use Topics    Alcohol use: Yes     Comment: occ    Drug use: No        Medications:    benzonatate (TESSALON) 200 MG capsule  predniSONE (DELTASONE) 20 MG tablet  amoxicillin (AMOXIL) 875 MG tablet  amphetamine-dextroamphetamine (ADDERALL) 10 MG tablet  buPROPion (WELLBUTRIN XL) 300 MG 24 hr tablet  clobetasol (TEMOVATE) 0.05 % external ointment  predniSONE (DELTASONE) 20 MG tablet  triamcinolone (KENALOG) 0.5 % external ointment          Review of Systems   All other systems reviewed and are negative.      Physical Exam   BP: 113/77  Pulse: 104  Temp:  98.5  F (36.9  C)  Resp: 20  SpO2: 98 %      Physical Exam  Vitals and nursing note reviewed.   Constitutional:       Appearance: He is ill-appearing.   HENT:      Head: Normocephalic.      Right Ear: Ear canal and external ear normal. Tympanic membrane is erythematous. Tympanic membrane is not bulging.      Left Ear: Ear canal and external ear normal.      Nose: Congestion present.      Mouth/Throat:      Mouth: Mucous membranes are moist.      Pharynx: Posterior oropharyngeal erythema present. No oropharyngeal exudate.      Comments: Bilateral tonsils 1+  Cardiovascular:      Rate and Rhythm: Normal rate and regular rhythm.      Pulses: Normal pulses.      Heart sounds: Normal heart sounds.   Pulmonary:      Effort: Pulmonary effort is normal.      Breath sounds: Normal breath sounds.   Neurological:      Mental Status: He is alert.   Psychiatric:         Mood and Affect: Mood normal.         Behavior: Behavior normal.         ED Course        Procedures        No results found for this or any previous visit (from the past 24 hours).    Medications - No data to display    Assessments & Plan (with Medical Decision Making)     I have reviewed the nursing notes.    I have reviewed the findings, diagnosis, plan and need for follow up with the patient.        Medical Decision Making  54 year old male who presents urgent care with chief complaint of cough and nasal congestion.  Patient reports acute onset of viral symptoms developed 5 days ago.  He was seen on 4/20/2025 and tested negative for strep throat but was diagnosed with a right ear infection.  He was prescribed amoxicillin for this.  Patient reports improvement of ear pain.  He continues to have nasal congestion, mild cough and fatigue.  Has been taking Advil without relief.  Denies nausea, vomiting, abdominal pain, chest pain, wheezing, shortness of breath.    Exam above.  Patient is ill-appearing in no acute distress.  Oropharynx is erythematous without  exudates.  Nasal congestion present.  Right TM is erythematous with borderline infection.    Plan to continue amoxicillin for right acute otitis media.  I educated him on viral illnesses which typically take 7 to 10 days to completely resolve.  If patient has symptoms longer than 10 days I urged him to follow-up with primary care provider.  I also recommend over-the-counter cold and flu medications such as Mucinex DM.  He was prescribed Tessalon and prednisone for sore throat and cough.    Prior to making a final disposition on this patient the results of patient's tests and other diagnostic studies were discussed with the patient. All questions were answered. Patient expressed understanding of the plan and was amenable to it.     Disclaimer: This note consists of symbols derived from keyboarding, dictation and/or voice recognition software. As a result, there may be errors in the script that have gone undetected. Please consider this when interpreting information found in this chart.      Discharge Medication List as of 4/23/2025  2:36 PM        START taking these medications    Details   benzonatate (TESSALON) 200 MG capsule Take 1 capsule (200 mg) by mouth 3 times daily as needed for cough., Disp-20 capsule, R-0, E-Prescribe      !! predniSONE (DELTASONE) 20 MG tablet Take two tablets (= 40mg) each day for 5 (five) days, Disp-10 tablet, R-0, E-Prescribe       !! - Potential duplicate medications found. Please discuss with provider.          Final diagnoses:   Viral illness       4/23/2025   [facility] EMERGENCY DEPT       Maida Quinones PA-C  04/23/25 [id number]

## 2025-04-24 ENCOUNTER — OFFICE VISIT (OUTPATIENT)
Dept: DERMATOLOGY | Facility: CLINIC | Age: 55
End: 2025-04-24
Attending: NURSE PRACTITIONER
Payer: COMMERCIAL

## 2025-04-24 DIAGNOSIS — L30.9 ECZEMA, UNSPECIFIED TYPE: ICD-10-CM

## 2025-04-24 DIAGNOSIS — L20.9 ATOPIC DERMATITIS, UNSPECIFIED TYPE: ICD-10-CM

## 2025-04-24 RX ORDER — CLOBETASOL PROPIONATE 0.5 MG/G
OINTMENT TOPICAL 2 TIMES DAILY
Qty: 60 G | Refills: 3 | Status: SHIPPED | OUTPATIENT
Start: 2025-04-24

## 2025-04-24 NOTE — LETTER
4/24/2025      Michael Waters  [id number]  [facility] [address]      Dear Colleague,    Thank you for referring your patient, Michael Waters, to the [facility]. Please see a copy of my visit note below.    [facility] Dermatology Note  Encounter Date: Apr 24, 2025  Office Visit     Reviewed patients past medical history and pertinent chart review prior to patients visit today.     Dermatology Problem List:  1.  Dyshidrotic eczema, clobetasol 0.05% ointment, 4/24/2025   - Previously prescribed oral prednisone and triamcinolone 0.5% cream  2.  Biopsy-proven spongiotic dermatitis, right forearm, 2013    ____________________________________________    CC: Derm Problem (Rash- hands hx of eczema )    HPI:  Mr. Michael Waters is a(n) 54 year old male who presents today as a return patient due to a concern of a rash involving the bilateral hands.  The patient does have a history of eczema in the past, but this never affected his hands.  At the end of March, the patient noticed dryness, itching, and pain involving his hands.  He did follow-up at urgent care and was prescribed a course of oral prednisone and triamcinolone 0.5% cream with improvement noted.  The patient states he is currently on another course of oral prednisone for a cough.  The patient does try to moisturize on a regular basis.    Patient is otherwise feeling well, without additional skin concerns.    Medications:  Current Outpatient Medications   Medication Sig Dispense Refill     amoxicillin (AMOXIL) 875 MG tablet Take 1 tablet (875 mg) by mouth 2 times daily for 7 days. 14 tablet 0     amphetamine-dextroamphetamine (ADDERALL) 10 MG tablet TAKE 2 TABLETS BY MOUTH ONCE DAILY AS NEEDED       benzonatate (TESSALON) 200 MG capsule Take 1 capsule (200 mg) by mouth 3 times daily as needed for cough. 20 capsule 0     buPROPion (WELLBUTRIN XL) 300 MG 24 hr tablet Take 1 tablet by mouth daily at 2 pm.       clobetasol  (TEMOVATE) 0.05 % external ointment Apply topically 2 times daily. Apply to hands for 2-3 weeks, then as needed for flares. 60 g 3     predniSONE (DELTASONE) 20 MG tablet Take two tablets (= 40mg) each day for 5 (five) days 10 tablet 0     predniSONE (DELTASONE) 20 MG tablet 2 tabs daily x 3 days, then 1 tab daily x 3 days, then 1/2 tab daily x 3 days then 1/4 for 3 days. 20 tablet 0     clobetasol (TEMOVATE) 0.05 % external ointment Apply sparingly to lower extremity(affected area) twice daily for 10 days and as needed daily thereafter .  Do not apply to face. 60 g 0     triamcinolone (KENALOG) 0.5 % external ointment Apply 1 g topically 2 times daily. 60 g 0     No current facility-administered medications for this visit.      Past Medical History:   Patient Active Problem List   Diagnosis     CARDIOVASCULAR SCREENING; LDL GOAL LESS THAN 160     Atopic dermatitis     Severe episode of recurrent major depressive disorder, without psychotic features (H)     Stiffness of finger joint, right     History reviewed. No pertinent past medical history.    ____________________________________________     Physical Exam:  Vitals: There were no vitals taken for this visit.   SKIN: The exam included bilateral hands.  - Cuenca II.  - Bilateral palms, pink, dry, scaling patches with few scattered tapioca like papules    - No other lesions of concern on areas examined.     _________________________________________    Assessment & Plan:   # Dyshidrotic eczema  Importance of diligent moisturization discussed.  I recommend a gentle cream, such as CeraVe or Vanicream, twice daily.  The patient has also found Sarna lotion to be helpful.  I prescribed clobetasol 0.05% ointment for the patient to apply to his hands twice daily for 2 to 3 weeks, then as needed for flares.  Clobetasol, a topical steroid, should not be applied daily long-term due to risk of atrophy.  Future considerations, if needed, can be tacrolimus 0.1% ointment for  maintenance or Dupixent if eczema worsens.    Follow-up:  2-3 months if not better    All risks, benefits and alternatives were discussed with patient.  Patient is in agreement and understands the assessment and plan.  All questions were answered.    Sharron Pham PA-C  [facility] Dermatology    CC SHARIF Akins CNP  [address] [place] [address] on close of this encounter.      Again, thank you for allowing me to participate in the care of your patient.        Sincerely,        Sharron Pham PA-C    Electronically signed

## 2025-04-25 NOTE — PROGRESS NOTES
[facility] Dermatology Note  Encounter Date: Apr 24, 2025  Office Visit     Reviewed patients past medical history and pertinent chart review prior to patients visit today.     Dermatology Problem List:  1.  Dyshidrotic eczema, clobetasol 0.05% ointment, 4/24/2025   - Previously prescribed oral prednisone and triamcinolone 0.5% cream  2.  Biopsy-proven spongiotic dermatitis, right forearm, 2013    ____________________________________________    CC: Derm Problem (Rash- hands hx of eczema )    HPI:  Mr. Michael Waters is a(n) 54 year old male who presents today as a return patient due to a concern of a rash involving the bilateral hands.  The patient does have a history of eczema in the past, but this never affected his hands.  At the end of March, the patient noticed dryness, itching, and pain involving his hands.  He did follow-up at urgent care and was prescribed a course of oral prednisone and triamcinolone 0.5% cream with improvement noted.  The patient states he is currently on another course of oral prednisone for a cough.  The patient does try to moisturize on a regular basis.    Patient is otherwise feeling well, without additional skin concerns.    Medications:  Current Outpatient Medications   Medication Sig Dispense Refill    amoxicillin (AMOXIL) 875 MG tablet Take 1 tablet (875 mg) by mouth 2 times daily for 7 days. 14 tablet 0    amphetamine-dextroamphetamine (ADDERALL) 10 MG tablet TAKE 2 TABLETS BY MOUTH ONCE DAILY AS NEEDED      benzonatate (TESSALON) 200 MG capsule Take 1 capsule (200 mg) by mouth 3 times daily as needed for cough. 20 capsule 0    buPROPion (WELLBUTRIN XL) 300 MG 24 hr tablet Take 1 tablet by mouth daily at 2 pm.      clobetasol (TEMOVATE) 0.05 % external ointment Apply topically 2 times daily. Apply to hands for 2-3 weeks, then as needed for flares. 60 g 3    predniSONE (DELTASONE) 20 MG tablet Take two tablets (= 40mg) each day for 5 (five) days 10 tablet 0     predniSONE (DELTASONE) 20 MG tablet 2 tabs daily x 3 days, then 1 tab daily x 3 days, then 1/2 tab daily x 3 days then 1/4 for 3 days. 20 tablet 0    clobetasol (TEMOVATE) 0.05 % external ointment Apply sparingly to lower extremity(affected area) twice daily for 10 days and as needed daily thereafter .  Do not apply to face. 60 g 0    triamcinolone (KENALOG) 0.5 % external ointment Apply 1 g topically 2 times daily. 60 g 0     No current facility-administered medications for this visit.      Past Medical History:   Patient Active Problem List   Diagnosis    CARDIOVASCULAR SCREENING; LDL GOAL LESS THAN 160    Atopic dermatitis    Severe episode of recurrent major depressive disorder, without psychotic features (H)    Stiffness of finger joint, right     History reviewed. No pertinent past medical history.    ____________________________________________     Physical Exam:  Vitals: There were no vitals taken for this visit.   SKIN: The exam included bilateral hands.  - Cuenca II.  - Bilateral palms, pink, dry, scaling patches with few scattered tapioca like papules    - No other lesions of concern on areas examined.     _________________________________________    Assessment & Plan:   # Dyshidrotic eczema  Importance of diligent moisturization discussed.  I recommend a gentle cream, such as CeraVe or Vanicream, twice daily.  The patient has also found Sarna lotion to be helpful.  I prescribed clobetasol 0.05% ointment for the patient to apply to his hands twice daily for 2 to 3 weeks, then as needed for flares.  Clobetasol, a topical steroid, should not be applied daily long-term due to risk of atrophy.  Future considerations, if needed, can be tacrolimus 0.1% ointment for maintenance or Dupixent if eczema worsens.    Follow-up:  2-3 months if not better    All risks, benefits and alternatives were discussed with patient.  Patient is in agreement and understands the assessment and plan.  All questions were  answered.    Sharron Pham PA-C  [facility] Dermatology    CC SHARIF Akins CNP  [address] [place] [address] on close of this encounter.

## 2025-05-01 ENCOUNTER — OFFICE VISIT (OUTPATIENT)
Dept: FAMILY MEDICINE | Facility: CLINIC | Age: 55
End: 2025-05-01
Payer: COMMERCIAL

## 2025-05-01 VITALS
TEMPERATURE: 98.4 F | HEART RATE: 97 BPM | SYSTOLIC BLOOD PRESSURE: 144 MMHG | DIASTOLIC BLOOD PRESSURE: 100 MMHG | BODY MASS INDEX: 30.35 KG/M2 | OXYGEN SATURATION: 98 % | WEIGHT: 229 LBS | RESPIRATION RATE: 16 BRPM | HEIGHT: 73 IN

## 2025-05-01 DIAGNOSIS — H90.72 MIXED CONDUCTIVE AND SENSORINEURAL HEARING LOSS OF LEFT EAR, UNSPECIFIED HEARING STATUS ON CONTRALATERAL SIDE: ICD-10-CM

## 2025-05-01 DIAGNOSIS — Z13.6 SCREENING FOR CARDIOVASCULAR CONDITION: ICD-10-CM

## 2025-05-01 DIAGNOSIS — Z11.4 SCREENING FOR HIV (HUMAN IMMUNODEFICIENCY VIRUS): ICD-10-CM

## 2025-05-01 DIAGNOSIS — Z23 IMMUNIZATION DUE: ICD-10-CM

## 2025-05-01 DIAGNOSIS — Z13.1 SCREENING FOR DIABETES MELLITUS: ICD-10-CM

## 2025-05-01 DIAGNOSIS — R05.2 SUBACUTE COUGH: ICD-10-CM

## 2025-05-01 DIAGNOSIS — Z00.00 ROUTINE GENERAL MEDICAL EXAMINATION AT A HEALTH CARE FACILITY: Primary | ICD-10-CM

## 2025-05-01 DIAGNOSIS — Z12.11 SCREEN FOR COLON CANCER: ICD-10-CM

## 2025-05-01 DIAGNOSIS — Z13.220 LIPID SCREENING: ICD-10-CM

## 2025-05-01 DIAGNOSIS — Z11.59 NEED FOR HEPATITIS C SCREENING TEST: ICD-10-CM

## 2025-05-01 LAB
ANION GAP SERPL CALCULATED.3IONS-SCNC: 11 MMOL/L (ref 7–15)
BUN SERPL-MCNC: 19.4 MG/DL (ref 6–20)
CALCIUM SERPL-MCNC: 9.1 MG/DL (ref 8.8–10.4)
CHLORIDE SERPL-SCNC: 106 MMOL/L (ref 98–107)
CHOLEST SERPL-MCNC: 208 MG/DL
CREAT SERPL-MCNC: 0.93 MG/DL (ref 0.67–1.17)
EGFRCR SERPLBLD CKD-EPI 2021: >90 ML/MIN/1.73M2
FASTING STATUS PATIENT QL REPORTED: NO
FASTING STATUS PATIENT QL REPORTED: NO
GLUCOSE SERPL-MCNC: 83 MG/DL (ref 70–99)
HCO3 SERPL-SCNC: 27 MMOL/L (ref 22–29)
HDLC SERPL-MCNC: 55 MG/DL
LDLC SERPL CALC-MCNC: 123 MG/DL
NONHDLC SERPL-MCNC: 153 MG/DL
POTASSIUM SERPL-SCNC: 3.9 MMOL/L (ref 3.4–5.3)
SODIUM SERPL-SCNC: 144 MMOL/L (ref 135–145)
TRIGL SERPL-MCNC: 148 MG/DL

## 2025-05-01 RX ORDER — BENZONATATE 100 MG/1
100 CAPSULE ORAL 3 TIMES DAILY PRN
Qty: 30 CAPSULE | Refills: 0 | Status: SHIPPED | OUTPATIENT
Start: 2025-05-01

## 2025-05-01 SDOH — HEALTH STABILITY: PHYSICAL HEALTH: ON AVERAGE, HOW MANY MINUTES DO YOU ENGAGE IN EXERCISE AT THIS LEVEL?: 60 MIN

## 2025-05-01 SDOH — HEALTH STABILITY: PHYSICAL HEALTH: ON AVERAGE, HOW MANY DAYS PER WEEK DO YOU ENGAGE IN MODERATE TO STRENUOUS EXERCISE (LIKE A BRISK WALK)?: 5 DAYS

## 2025-05-01 ASSESSMENT — PATIENT HEALTH QUESTIONNAIRE - PHQ9
SUM OF ALL RESPONSES TO PHQ QUESTIONS 1-9: 2
SUM OF ALL RESPONSES TO PHQ QUESTIONS 1-9: 2

## 2025-05-01 ASSESSMENT — PAIN SCALES - GENERAL: PAINLEVEL_OUTOF10: NO PAIN (0)

## 2025-05-01 ASSESSMENT — SOCIAL DETERMINANTS OF HEALTH (SDOH): HOW OFTEN DO YOU GET TOGETHER WITH FRIENDS OR RELATIVES?: ONCE A WEEK

## 2025-05-01 NOTE — PROGRESS NOTES
Preventive Care Visit  [facility]  SHARIF Rocha CNP, Nurse Practitioner Primary Care  May 1, 2025      Assessment & Plan     Routine general medical examination at a health care facility  Patient is in overall general good health. Patient had no concerns during today's visit.  Patient was seen in ED three times within the last month wanted to establish care with primary.     - REVIEW OF HEALTH MAINTENANCE PROTOCOL ORDERS    Mixed conductive and sensorineural hearing loss of left ear, unspecified hearing status on contralateral side  Patient reports progressive loss of hearing in left ear. Patient feels it is from work environment. Patient denies injury or illness. Ear canal was clear and clean of cerumen or debris. Referring to Audiology for further testing.      - Adult Audiology  Referral; Future    Subacute cough  Patient reports lingering cough from viral illness. Reordered Tessalon Perles for cough.     - benzonatate (TESSALON) 100 MG capsule; Take 1 capsule (100 mg) by mouth 3 times daily as needed for cough.    Screening for cardiovascular condition  Checking lipids.    - Lipid panel reflex to direct LDL Non-fasting; Future  - Lipid panel reflex to direct LDL Non-fasting    Screening for diabetes mellitus  Checking for diabetes. Patient reports he occasionally has bilateral burning sensation on bottom of feet.     - Basic metabolic panel  (Ca, Cl, CO2, Creat, Gluc, K, Na, BUN); Future  - Basic metabolic panel  (Ca, Cl, CO2, Creat, Gluc, K, Na, BUN)    Screen for colon cancer    - Colonoscopy Screening  Referral; Future    Screening for HIV (human immunodeficiency virus)    - HIV Antigen Antibody Combo; Future  - HIV Antigen Antibody Combo    Need for hepatitis C screening test    - Hepatitis C Screen Reflex to HCV RNA Quant and Genotype; Future  - Hepatitis C Screen Reflex to HCV RNA Quant and Genotype    Lipid screening    - Lipid panel reflex to  "direct LDL Non-fasting; Future  - Lipid panel reflex to direct LDL Non-fasting    Immunization due    - TDAP 10-64Y (ADACEL,BOOSTRIX)  - HEPATITIS B, ADULT 20+ (ENGERIX-B/RECOMBIVAX HB)  - Pneumococcal 20 Valent Conjugate (PCV20)  - ZOSTER RECOMBINANT ADJUVANTED (SHINGRIX)    Patient has been advised of split billing requirements and indicates understanding: Yes        BMI  Estimated body mass index is 30.63 kg/m  as calculated from the following:    Height as of this encounter: 1.842 m (6' 0.5\").    Weight as of this encounter: 103.9 kg (229 lb).   Weight management plan: Discussed healthy diet and exercise guidelines    Counseling  Appropriate preventive services were addressed with this patient via screening, questionnaire, or discussion as appropriate for fall prevention, nutrition, physical activity, Tobacco-use cessation, social engagement, weight loss and cognition.  Checklist reviewing preventive services available has been given to the patient.  Reviewed patient's diet, addressing concerns and/or questions.   The patient was instructed to see the dentist every 6 months.   He is at risk for psychosocial distress and has been provided with information to reduce risk.   The patient reports drinking more than 3 alcoholic drinks per day and/or more than 7 drhnks per week. The patient was counseled and given information about possible harmful effects of excessive alcohol intake.    Follow-up    Follow-up Visit   Expected date:  May 01, 2026 (Approximate)      Follow Up Appointment Details:     Follow-up with whom?: PCP    Follow-Up for what?: Adult Preventive    How?: In Person                 Girma Rodriguez is a 54 year old, presenting for the following:  Physical        5/1/2025     2:29 PM   Additional Questions   Roomed by Kecia JAFFE  Patient is in overall general good health. Patient had no concerns during today's visit.  Patient was seen in ED three times within the last month wanted to " establish care with primary. Patient reports progressive loss of hearing in left ear. Patient feels it is from work environment. Patient denies injury or illness. Ear canal was clear and clean of cerumen or debris. Referring to Audiology for further testing. Patient reports lingering cough from viral illness. Patient denies headache, pain, GI concerns, and nausea. Blood pressure is elevated. Patient advised to take blood pressure readings at home and return for nurse visit and blood pressure recheck in two weeks.     Concern - Left ear loss of hearing  Onset: For about a year  Description: Not able to hear as well as he used to in his left ear.   Intensity: mild  Progression of Symptoms:  worsening  Accompanying Signs & Symptoms: None  Previous history of similar problem: None  Precipitating factors:        Worsened by: N/A  Alleviating factors:        Improved by: N/A  Therapies tried and outcome: None    Annual Wellness Visit     Patient has been advised of split billing requirements and indicates understanding: Yes      Health Care Directive  Patient does not have a Health Care Directive: Discussed advance care planning with patient; however, patient declined at this time.      5/1/2025   General Health   How would you rate your overall physical health? Good   Feel stress (tense, anxious, or unable to sleep) To some extent          5/1/2025   Nutrition   Diet: Regular (no restrictions)         5/1/2025   Exercise   Days per week of moderate/strenous exercise 5 days   Average minutes spent exercising at this level 60 min           5/1/2025   Social Factors   Frequency of gathering with friends or relatives Once a week   Worry food won't last until get money to buy more No   Food not last or not have enough money for food? No   Do you have housing? (Housing is defined as stable permanent housing and does not include staying outside in a car, in a tent, in an abandoned building, in an overnight shelter, or  couch-surfing.) Yes   Are you worried about losing your housing? No   Lack of transportation? No   Unable to get utilities (heat,electricity)? No           5/1/2025   Fall Risk   Fallen 2 or more times in the past year? No   Trouble with walking or balance? No           No data to display                  5/1/2025   Dental   Dentist two times every year? (!) NO          No data to display                     No data to display                   No data to display                  5/1/2025   Substance Use   Alcohol more than 3/day or more than 7/wk Yes   How often do you have a drink containing alcohol 4 or more times a week   How many alcohol drinks on typical day 1 or 2   How often do you have 5+ drinks at one occasion Less than monthly   Audit 2/3 Score 1   How often not able to stop drinking once started Never   How often failed to do what normally expected Never   How often needed first drink in am after a heavy drinking session Never   How often feeling of guilt or remorse after drinking Less than monthly   How often unable to remember what happened the night before Never   Have you or someone else been injured because of your drinking No   Has anyone been concerned or suggested you cut down on drinking No   TOTAL SCORE - AUDIT 6   Do you use any other substances recreationally? No     Social History     Tobacco Use    Smoking status: Never    Smokeless tobacco: Never   Vaping Use    Vaping status: Some Days    Substances: Nicotine   Substance Use Topics    Alcohol use: Yes     Comment: occ    Drug use: No       Today's PHQ-9 Score:       5/1/2025     2:27 PM   PHQ-9 SCORE   PHQ-9 Total Score MyChart 2 (Minimal depression)   PHQ-9 Total Score 2        Patient-reported           5/1/2025   One time HIV Screening   Previous HIV test? I don't know         5/1/2025   STI Screening   New sexual partner(s) since last STI/HIV test? No   ASCVD Risk   The 10-year ASCVD risk score (Franco WRIGHT, et al., 2019) is:  5.5%    Values used to calculate the score:      Age: 54 years      Sex: Male      Is Non- : No      Diabetic: No      Tobacco smoker: No      Systolic Blood Pressure: 144 mmHg      Is BP treated: No      HDL Cholesterol: 55 mg/dL      Total Cholesterol: 208 mg/dL    Fracture Risk Assessment Tool  Link to Frax Calculator  Use the information below to complete the Frax calculator  : 1970  Sex: male  Weight (kg): 103.9 kg (actual weight)  Height (cm): 184.2 cm  Previous Fragility Fracture:  No  History of parent with fractured hip:  No  Current Smoking:  No  Patient has been on glucocorticoids for more than 3 months (5mg/day or more): No  Rheumatoid Arthritis on Problem List:  No  Secondary Osteoporosis on Problem List:  No  Consumes 3 or more units of alcohol per day: No  Femoral Neck BMD (g/cm2)             Reviewed and updated as needed this visit by Provider     Meds  Problems    Fam Hx            Lab work is in process    Current providers sharing in care for this patient include:  Patient Care Team:  Donna Funes APRN CNP as PCP - General (Nurse Practitioner Primary Care)  Day Denny MD as Assigned PCP  Jc Hurt MD as Assigned Musculoskeletal Provider  Juan A Obregon MD as MD (Dermatology)    The following health maintenance items are reviewed in Epic and correct as of today:  Health Maintenance   Topic Date Due    ADVANCE CARE PLANNING  Never done    COLORECTAL CANCER SCREENING  Never done    HIV SCREENING  Never done    HEPATITIS C SCREENING  Never done    COVID-19 Vaccine ( season) 2024    HEPATITIS B IMMUNIZATION (2 of 3 - 19+ 3-dose series) 2025    ZOSTER IMMUNIZATION (2 of 2) 2025    INFLUENZA VACCINE (Season Ended) 2025    PHQ-9  2025    YEARLY PREVENTIVE VISIT  2026    ANNUAL REVIEW OF HM ORDERS  2026    DIABETES SCREENING  2028    LIPID  2030    DTAP/TDAP/TD  IMMUNIZATION (3 - Td or Tdap) 05/01/2035    DEPRESSION ACTION PLAN  Completed    Pneumococcal Vaccine: 50+ Years  Completed    HPV IMMUNIZATION  Aged Out    MENINGITIS IMMUNIZATION  Aged Out       Appropriate preventive services were discussed with this patient, including applicable screening as appropriate for fall prevention, nutrition, physical activity, Tobacco-use cessation, weight loss and cognition.  Checklist reviewing preventive services available has been given to the patient.        Advance Care Planning    Discussed advance care planning with patient; however, patient declined at this time.        5/1/2025   General Health   How would you rate your overall physical health? Good   Feel stress (tense, anxious, or unable to sleep) To some extent         5/1/2025   Nutrition   Three or more servings of calcium each day? (!) NO   Diet: Regular (no restrictions)   How many servings of fruit and vegetables per day? (!) 0-1   How many sweetened beverages each day? (!) 2         5/1/2025   Exercise   Days per week of moderate/strenous exercise 5 days   Average minutes spent exercising at this level 60 min         5/1/2025   Social Factors   Frequency of gathering with friends or relatives Once a week         5/1/2025   Dental   Dentist two times every year? (!) NO       Today's PHQ-9 Score:       5/1/2025     2:27 PM   PHQ-9 SCORE   PHQ-9 Total Score MyChart 2 (Minimal depression)   PHQ-9 Total Score 2        Patient-reported         5/1/2025   Substance Use   Alcohol more than 3/day or more than 7/wk Yes     Social History     Tobacco Use    Smoking status: Never    Smokeless tobacco: Never   Vaping Use    Vaping status: Some Days    Substances: Nicotine   Substance Use Topics    Alcohol use: Yes     Comment: occ    Drug use: No       ASCVD Risk   The ASCVD Risk score (Franco WRIGHT, et al., 2019) failed to calculate for the following reasons:    Cannot find a previous HDL lab    Cannot find a previous  "total cholesterol lab         Reviewed and updated as needed this visit by Provider                  Review of Systems  Constitutional, HEENT, cardiovascular, pulmonary, GI, , musculoskeletal, neuro, skin, endocrine and psych systems are negative, except as otherwise noted.     Objective    Exam  BP (!) 142/90 (BP Location: Right arm, Patient Position: Sitting, Cuff Size: Adult Regular)   Pulse 97   Temp 98.4  F (36.9  C) (Tympanic)   Resp 16   Ht 1.842 m (6' 0.5\")   Wt 103.9 kg (229 lb)   SpO2 98%   BMI 30.63 kg/m     Estimated body mass index is 30.63 kg/m  as calculated from the following:    Height as of this encounter: 1.842 m (6' 0.5\").    Weight as of this encounter: 103.9 kg (229 lb).    Physical Exam  GENERAL: alert and no distress  EYES: Eyes grossly normal to inspection, PERRL and conjunctivae and sclerae normal  HENT: ear canals and TM's normal, nose and mouth without ulcers or lesions  NECK: no adenopathy, no asymmetry, masses, or scars  RESP: lungs clear to auscultation - no rales, rhonchi or wheezes  CV: regular rate and rhythm, normal S1 S2, no S3 or S4, no murmur, click or rub, no peripheral edema  ABDOMEN: soft, nontender, no hepatosplenomegaly, no masses and bowel sounds normal  MS: no gross musculoskeletal defects noted, no edema  SKIN: no suspicious lesions or rashes  NEURO: Normal strength and tone, mentation intact and speech normal  PSYCH: mentation appears normal, affect normal/bright    Signed Electronically by: SHARIF Rocha CNP    "

## 2025-05-01 NOTE — PROGRESS NOTES
Prior to immunization administration, verified patients identity using patient s name and date of birth. Please see Immunization Activity for additional information.     Screening Questionnaire for Adult Immunization    Are you sick today?   No   Do you have allergies to medications, food, a vaccine component or latex?   No   Have you ever had a serious reaction after receiving a vaccination?   No   Do you have a long-term health problem with heart, lung, kidney, or metabolic disease (e.g., diabetes), asthma, a blood disorder, no spleen, complement component deficiency, a cochlear implant, or a spinal fluid leak?  Are you on long-term aspirin therapy?   No   Do you have cancer, leukemia, HIV/AIDS, or any other immune system problem?   No   Do you have a parent, brother, or sister with an immune system problem?   No   In the past 3 months, have you taken medications that affect  your immune system, such as prednisone, other steroids, or anticancer drugs; drugs for the treatment of rheumatoid arthritis, Crohn s disease, or psoriasis; or have you had radiation treatments?   Yes   Have you had a seizure, or a brain or other nervous system problem?   No   During the past year, have you received a transfusion of blood or blood    products, or been given immune (gamma) globulin or antiviral drug?   No   For women: Are you pregnant or is there a chance you could become       pregnant during the next month?   No   Have you received any vaccinations in the past 4 weeks?   No     Immunization questionnaire was positive for at least one answer.  Notified Donna Funes CNP.      Patient instructed to remain in clinic for 15 minutes afterwards, and to report any adverse reactions.     Screening performed by Kecia Drew MA on 5/1/2025 at 3:31 PM.

## 2025-05-01 NOTE — PATIENT INSTRUCTIONS
Patient Education   Preventive Care Advice   This is general advice given by our system to help you stay healthy. However, your care team may have specific advice just for you. Please talk to your care team about your preventive care needs.  Nutrition  Eat 5 or more servings of fruits and vegetables each day.  Try wheat bread, brown rice and whole grain pasta (instead of white bread, rice, and pasta).  Get enough calcium and vitamin D. Check the label on foods and aim for 100% of the RDA (recommended daily allowance).  Lifestyle  Exercise at least 150 minutes each week  (30 minutes a day, 5 days a week).  Do muscle strengthening activities 2 days a week. These help control your weight and prevent disease.  No smoking.  Wear sunscreen to prevent skin cancer.  Have a dental exam and cleaning every 6 months.  Yearly exams  See your health care team every year to talk about:  Any changes in your health.  Any medicines your care team has prescribed.  Preventive care, family planning, and ways to prevent chronic diseases.  Shots (vaccines)   HPV shots (up to age 26), if you've never had them before.  Hepatitis B shots (up to age 59), if you've never had them before.  COVID-19 shot: Get this shot when it's due.  Flu shot: Get a flu shot every year.  Tetanus shot: Get a tetanus shot every 10 years.  Pneumococcal, hepatitis A, and RSV shots: Ask your care team if you need these based on your risk.  Shingles shot (for age 50 and up)  General health tests  Diabetes screening:  Starting at age 35, Get screened for diabetes at least every 3 years.  If you are younger than age 35, ask your care team if you should be screened for diabetes.  Cholesterol test: At age 39, start having a cholesterol test every 5 years, or more often if advised.  Bone density scan (DEXA): At age 50, ask your care team if you should have this scan for osteoporosis (brittle bones).  Hepatitis C: Get tested at least once in your life.  STIs (sexually  transmitted infections)  Before age 24: Ask your care team if you should be screened for STIs.  After age 24: Get screened for STIs if you're at risk. You are at risk for STIs (including HIV) if:  You are sexually active with more than one person.  You don't use condoms every time.  You or a partner was diagnosed with a sexually transmitted infection.  If you are at risk for HIV, ask about PrEP medicine to prevent HIV.  Get tested for HIV at least once in your life, whether you are at risk for HIV or not.  Cancer screening tests  Cervical cancer screening: If you have a cervix, begin getting regular cervical cancer screening tests starting at age 21.  Breast cancer scan (mammogram): If you've ever had breasts, begin having regular mammograms starting at age 40. This is a scan to check for breast cancer.  Colon cancer screening: It is important to start screening for colon cancer at age 45.  Have a colonoscopy test every 10 years (or more often if you're at risk) Or, ask your provider about stool tests like a FIT test every year or Cologuard test every 3 years.  To learn more about your testing options, visit:   .  For help making a decision, visit:   https://bit.ly/yk04164.  Prostate cancer screening test: If you have a prostate, ask your care team if a prostate cancer screening test (PSA) at age 55 is right for you.  Lung cancer screening: If you are a current or former smoker ages 50 to 80, ask your care team if ongoing lung cancer screenings are right for you.  For informational purposes only. Not to replace the advice of your health care provider. Copyright   2023 [facility]. All rights reserved. Clinically reviewed by the [facility]. [place] [id number] - REV 01/24.  Learning About Stress  What is stress?     Stress is your body's response to a hard situation. Your body can have a physical, emotional, or mental response. Stress is a fact of life for most people, and it  affects everyone differently. What causes stress for you may not be stressful for someone else.  A lot of things can cause stress. You may feel stress when you go on a job interview, take a test, or run a race. This kind of short-term stress is normal and even useful. It can help you if you need to work hard or react quickly. For example, stress can help you finish an important job on time.  Long-term stress is caused by ongoing stressful situations or events. Examples of long-term stress include long-term health problems, ongoing problems at work, or conflicts in your family. Long-term stress can harm your health.  How does stress affect your health?  When you are stressed, your body responds as though you are in danger. It makes hormones that speed up your heart, make you breathe faster, and give you a burst of energy. This is called the fight-or-flight stress response. If the stress is over quickly, your body goes back to normal and no harm is done.  But if stress happens too often or lasts too long, it can have bad effects. Long-term stress can make you more likely to get sick, and it can make symptoms of some diseases worse. If you tense up when you are stressed, you may develop neck, shoulder, or low back pain. Stress is linked to high blood pressure and heart disease.  Stress also harms your emotional health. It can make you ewing, tense, or depressed. Your relationships may suffer, and you may not do well at work or school.  What can you do to manage stress?  You can try these things to help manage stress:   Do something active. Exercise or activity can help reduce stress. Walking is a great way to get started. Even everyday activities such as housecleaning or yard work can help.  Try yoga or saud chi. These techniques combine exercise and meditation. You may need some training at first to learn them.  Do something you enjoy. For example, listen to music or go to a movie. Practice your hobby or do volunteer  "work.  Meditate. This can help you relax, because you are not worrying about what happened before or what may happen in the future.  Do guided imagery. Imagine yourself in any setting that helps you feel calm. You can use online videos, books, or a teacher to guide you.  Do breathing exercises. For example:  From a standing position, bend forward from the waist with your knees slightly bent. Let your arms dangle close to the floor.  Breathe in slowly and deeply as you return to a standing position. Roll up slowly and lift your head last.  Hold your breath for just a few seconds in the standing position.  Breathe out slowly and bend forward from the waist.  Let your feelings out. Talk, laugh, cry, and express anger when you need to. Talking with supportive friends or family, a counselor, or a rafi leader about your feelings is a healthy way to relieve stress. Avoid discussing your feelings with people who make you feel worse.  Write. It may help to write about things that are bothering you. This helps you find out how much stress you feel and what is causing it. When you know this, you can find better ways to cope.  What can you do to prevent stress?  You might try some of these things to help prevent stress:  Manage your time. This helps you find time to do the things you want and need to do.  Get enough sleep. Your body recovers from the stresses of the day while you are sleeping.  Get support. Your family, friends, and community can make a difference in how you experience stress.  Limit your news feed. Avoid or limit time on social media or news that may make you feel stressed.  Do something active. Exercise or activity can help reduce stress. Walking is a great way to get started.  Where can you learn more?  Go to https://www.[employer].net/patiented  Enter N032 in the search box to learn more about \"Learning About Stress.\"  Current as of: October 24, 2024  Content Version: 14.4 2024-2025 [employer] " "[employer]   Care instructions adapted under license by your healthcare professional. If you have questions about a medical condition or this instruction, always ask your healthcare professional. [employer] disclaims any warranty or liability for your use of this information.    9 Ways to Cut Back on Drinking  Maybe you've found yourself drinking more alcohol than you'd prefer. If you want to cut back, here are some ideas to try.    Think before you drink.  Do you really want a drink, or is it just a habit? If you're used to having a drink at a certain time, try doing something else then.     Look for substitutes.  Find some no-alcohol drinks that you enjoy, like flavored seltzer water, tea with honey, or tonic with a slice of lime. Or try alcohol-free beer or \"virgin\" cocktails (without the alcohol).     Drink more water.  Use water to quench your thirst. Drink a glass of water before you have any alcohol. Have another glass along with every drink or between drinks.     Shrink your drink.  For example, have a bottle of beer instead of a pint. Use a smaller glass for wine. Choose drinks with lower alcohol content (ABV%). Or use less liquor and more mixer in cocktails.     Slow down.  It's easy to drink quickly and without thinking about it. Pay attention, and make each drink last longer.     Do the math.  Total up how much you spend on alcohol each month. How much is that a year? If you cut back, what could you do with the money you save?     Take a break.  Choose a day or two each week when you won't drink at all. Notice how you feel on those days, physically and emotionally. How did you sleep? Do you feel better? Over time, add more break days.     Count calories.  Would you like to lose some weight? For some people that's a good motivator for cutting back. Figure out how many calories are in each drink. How many does that add up to in a day? In a week? In a month?     Practice saying no.  Be ready when " "someone offers you a drink. Try: \"Thanks, I've had enough.\" Or \"Thanks, but I'm cutting back.\" Or \"No, thanks. I feel better when I drink less.\"   Current as of: August 20, 2024  Content Version: 14.4    3711-1055 [employer].   Care instructions adapted under license by your healthcare professional. If you have questions about a medical condition or this instruction, always ask your healthcare professional. [employer] disclaims any warranty or liability for your use of this information.     "

## 2025-05-05 ENCOUNTER — PATIENT OUTREACH (OUTPATIENT)
Dept: CARE COORDINATION | Facility: CLINIC | Age: 55
End: 2025-05-05
Payer: COMMERCIAL

## 2025-05-06 ENCOUNTER — PATIENT OUTREACH (OUTPATIENT)
Dept: CARE COORDINATION | Facility: CLINIC | Age: 55
End: 2025-05-06
Payer: COMMERCIAL

## 2025-05-15 ENCOUNTER — TELEPHONE (OUTPATIENT)
Dept: FAMILY MEDICINE | Facility: CLINIC | Age: 55
End: 2025-05-15

## 2025-05-15 ENCOUNTER — ALLIED HEALTH/NURSE VISIT (OUTPATIENT)
Dept: FAMILY MEDICINE | Facility: CLINIC | Age: 55
End: 2025-05-15
Payer: COMMERCIAL

## 2025-05-15 VITALS
OXYGEN SATURATION: 96 % | DIASTOLIC BLOOD PRESSURE: 98 MMHG | HEART RATE: 97 BPM | RESPIRATION RATE: 16 BRPM | SYSTOLIC BLOOD PRESSURE: 140 MMHG

## 2025-05-15 DIAGNOSIS — Z01.30 BLOOD PRESSURE CHECK: Primary | ICD-10-CM

## 2025-05-15 NOTE — TELEPHONE ENCOUNTER
Michael Thompson Jt is a 54 year old year old patient who comes in today for a Blood Pressure check because of ongoing blood pressure monitoring.  OV 5/1: Bp 144/100, 142/90. Pt asked to come in for recheck  Pt not on Bp meds     Vital Signs as repeated by RN   142/98 p97  140/98 p97    Patient is monitoring Blood Pressure at home.   Has checked home BP 5-6x over the last 2 weeks: BP running 140's/ unsure of diastolic.   Current complaints: none  Disposition:  routed to provider for review.     Preferred pharmacy: Vasiliy humphries RN

## 2025-05-15 NOTE — TELEPHONE ENCOUNTER
Have patient make a follow-up appointment with Donna Funes for discussion on BP.  Make sure he is reducing his salt intake and let him know that reducing weight 5-10 lbs can improve BP as well.    Karen Herrera NP on 5/15/2025 at 12:07 PM

## 2025-05-15 NOTE — PROGRESS NOTES
Michael Waters is a 54 year old year old patient who comes in today for a Blood Pressure check because of ongoing blood pressure monitoring.  OV 5/1: Bp 144/100, 142/90. Pt asked to come in for recheck  Pt not on Bp meds    Vital Signs as repeated by RN   142/98 p97  140/98 p97    Patient is monitoring Blood Pressure at home.   Has checked home BP 5-6x over the last 2 weeks: BP running 140's/ unsure of diastolic.   Current complaints: none  Disposition:  routed to provider for review.    Preferred pharmacy: [facility]iliy uhmphries RN

## 2025-05-29 ENCOUNTER — OFFICE VISIT (OUTPATIENT)
Dept: FAMILY MEDICINE | Facility: CLINIC | Age: 55
End: 2025-05-29
Payer: COMMERCIAL

## 2025-05-29 VITALS
HEIGHT: 71 IN | HEART RATE: 98 BPM | SYSTOLIC BLOOD PRESSURE: 145 MMHG | RESPIRATION RATE: 16 BRPM | OXYGEN SATURATION: 97 % | DIASTOLIC BLOOD PRESSURE: 97 MMHG | BODY MASS INDEX: 31.98 KG/M2 | WEIGHT: 228.4 LBS | TEMPERATURE: 98.4 F

## 2025-05-29 DIAGNOSIS — G44.209 TENSION HEADACHE: ICD-10-CM

## 2025-05-29 DIAGNOSIS — I10 BENIGN ESSENTIAL HYPERTENSION: Primary | ICD-10-CM

## 2025-05-29 RX ORDER — LISINOPRIL 5 MG/1
5 TABLET ORAL DAILY
Qty: 90 TABLET | Refills: 0 | Status: SHIPPED | OUTPATIENT
Start: 2025-05-29

## 2025-05-29 ASSESSMENT — PAIN SCALES - GENERAL: PAINLEVEL_OUTOF10: NO PAIN (0)

## 2025-05-29 NOTE — PROGRESS NOTES
"  Assessment & Plan     Benign essential hypertension  New diagnosis, starting on 5mg Lisinopril and rechecking blood pressure in two weeks.     - lisinopril (ZESTRIL) 5 MG tablet; Take 1 tablet (5 mg) by mouth daily.    Tension headache  Patient reports tension type headaches two to three times weekly. Discussed increased hydration and Excedrin OTC. Patient advised to follow up if headaches persist.      Girma Rodriguez is a 55 year old, presenting for the following health issues:  Results        5/29/2025     3:06 PM   Additional Questions   Roomed by Kecia Drew   Accompanied by self     Patient blood pressures are consistently elevated. Shared decision making to start Lisinopril 5 mg daily. Patient advised to follow up in two weeks for blood pressure recheck. Patient reports tension type headaches. Patient reports he has a stressful job and gets headaches 3 to 5 times weekly. patient education regarding adequate hydration and Excedrin OTC for relief. Patient advised to follow up if headaches persist. Patient denies pain, GI concerns, nausea.     History of Present Illness       Reason for visit:  Blood pressure           Test result review    Review of Systems  CONSTITUTIONAL: NEGATIVE for fever, chills, change in weight  ENT/MOUTH: NEGATIVE for ear, mouth and throat problems  RESP: NEGATIVE for significant cough or SOB  CV: NEGATIVE for chest pain, palpitations or peripheral edema      Objective    BP (!) 140/86 (BP Location: Right arm, Patient Position: Sitting, Cuff Size: Adult Regular)   Pulse 98   Temp 98.4  F (36.9  C) (Tympanic)   Resp 16   Ht 1.81 m (5' 11.25\")   Wt 103.6 kg (228 lb 6.4 oz)   SpO2 97%   BMI 31.63 kg/m    Body mass index is 31.63 kg/m .    Physical Exam   GENERAL: alert and no distress  NECK: no adenopathy, no asymmetry, masses, or scars  RESP: lungs clear to auscultation - no rales, rhonchi or wheezes  CV: regular rate and rhythm, normal S1 S2, no S3 or S4, no murmur, click " or rub, no peripheral edema  ABDOMEN: soft, nontender, no hepatosplenomegaly, no masses and bowel sounds normal  MS: no gross musculoskeletal defects noted, no edema        Signed Electronically by: SHARIF Rocha CNP

## 2025-06-02 ENCOUNTER — TELEPHONE (OUTPATIENT)
Dept: FAMILY MEDICINE | Facility: CLINIC | Age: 55
End: 2025-06-02
Payer: COMMERCIAL

## 2025-06-02 NOTE — TELEPHONE ENCOUNTER
Patient Quality Outreach    Patient is due for the following:   Hypertension -  BP check    Action(s) Taken:   Patient has upcoming appointment, these items will be addressed at that time.    Type of outreach:    Chart review performed, no outreach needed.    Questions for provider review:    None         Kecia Drew MA  Chart routed to None.

## 2025-08-25 DIAGNOSIS — I10 BENIGN ESSENTIAL HYPERTENSION: ICD-10-CM

## 2025-08-25 RX ORDER — LISINOPRIL 5 MG/1
5 TABLET ORAL DAILY
Qty: 90 TABLET | Refills: 0 | Status: SHIPPED | OUTPATIENT
Start: 2025-08-25

## 2025-09-04 ENCOUNTER — TELEPHONE (OUTPATIENT)
Dept: FAMILY MEDICINE | Facility: CLINIC | Age: 55
End: 2025-09-04
Payer: COMMERCIAL